# Patient Record
Sex: MALE | Race: WHITE | NOT HISPANIC OR LATINO | Employment: FULL TIME | URBAN - METROPOLITAN AREA
[De-identification: names, ages, dates, MRNs, and addresses within clinical notes are randomized per-mention and may not be internally consistent; named-entity substitution may affect disease eponyms.]

---

## 2017-01-13 ENCOUNTER — HOSPITAL ENCOUNTER (OUTPATIENT)
Dept: CARDIOLOGY | Facility: CLINIC | Age: 32
Discharge: HOME | End: 2017-01-13
Payer: COMMERCIAL

## 2017-01-13 ENCOUNTER — OFFICE VISIT (OUTPATIENT)
Dept: CARDIOLOGY | Facility: CLINIC | Age: 32
End: 2017-01-13
Payer: COMMERCIAL

## 2017-01-13 VITALS
HEART RATE: 73 BPM | SYSTOLIC BLOOD PRESSURE: 117 MMHG | DIASTOLIC BLOOD PRESSURE: 88 MMHG | HEIGHT: 67 IN | BODY MASS INDEX: 34.5 KG/M2 | WEIGHT: 219.81 LBS

## 2017-01-13 DIAGNOSIS — I10 ESSENTIAL HYPERTENSION: Primary | ICD-10-CM

## 2017-01-13 DIAGNOSIS — E66.09 NON MORBID OBESITY DUE TO EXCESS CALORIES: ICD-10-CM

## 2017-01-13 DIAGNOSIS — I10 ESSENTIAL HYPERTENSION: ICD-10-CM

## 2017-01-13 DIAGNOSIS — E78.00 PURE HYPERCHOLESTEROLEMIA: ICD-10-CM

## 2017-01-13 PROCEDURE — 99999 PR PBB SHADOW E&M-EST. PATIENT-LVL III: CPT | Mod: PBBFAC,,, | Performed by: INTERNAL MEDICINE

## 2017-01-13 PROCEDURE — 1159F MED LIST DOCD IN RCRD: CPT | Mod: S$GLB,,, | Performed by: INTERNAL MEDICINE

## 2017-01-13 PROCEDURE — 3079F DIAST BP 80-89 MM HG: CPT | Mod: S$GLB,,, | Performed by: INTERNAL MEDICINE

## 2017-01-13 PROCEDURE — 3074F SYST BP LT 130 MM HG: CPT | Mod: S$GLB,,, | Performed by: INTERNAL MEDICINE

## 2017-01-13 PROCEDURE — 99214 OFFICE O/P EST MOD 30 MIN: CPT | Mod: S$GLB,,, | Performed by: INTERNAL MEDICINE

## 2017-01-13 PROCEDURE — 93000 ELECTROCARDIOGRAM COMPLETE: CPT | Mod: S$GLB,,, | Performed by: INTERNAL MEDICINE

## 2017-01-23 ENCOUNTER — PATIENT MESSAGE (OUTPATIENT)
Dept: PSYCHIATRY | Facility: CLINIC | Age: 32
End: 2017-01-23

## 2017-01-23 DIAGNOSIS — F32.A DEPRESSION, UNSPECIFIED DEPRESSION TYPE: ICD-10-CM

## 2017-01-23 RX ORDER — BUPROPION HYDROCHLORIDE 150 MG/1
450 TABLET ORAL DAILY
Qty: 270 TABLET | Refills: 3 | Status: SHIPPED | OUTPATIENT
Start: 2017-01-23 | End: 2017-02-10 | Stop reason: SDUPTHER

## 2017-02-09 RX ORDER — SERTRALINE HYDROCHLORIDE 100 MG/1
TABLET, FILM COATED ORAL
Qty: 135 TABLET | Refills: 0 | Status: SHIPPED | OUTPATIENT
Start: 2017-02-09 | End: 2017-04-28 | Stop reason: SDUPTHER

## 2017-02-10 ENCOUNTER — PATIENT MESSAGE (OUTPATIENT)
Dept: PSYCHIATRY | Facility: CLINIC | Age: 32
End: 2017-02-10

## 2017-02-10 DIAGNOSIS — F32.A DEPRESSION, UNSPECIFIED DEPRESSION TYPE: ICD-10-CM

## 2017-02-10 RX ORDER — BUPROPION HYDROCHLORIDE 150 MG/1
450 TABLET ORAL DAILY
Qty: 270 TABLET | Refills: 3 | Status: SHIPPED | OUTPATIENT
Start: 2017-02-10 | End: 2018-03-07 | Stop reason: SDUPTHER

## 2017-02-13 ENCOUNTER — PATIENT MESSAGE (OUTPATIENT)
Dept: PSYCHIATRY | Facility: CLINIC | Age: 32
End: 2017-02-13

## 2017-04-28 ENCOUNTER — OFFICE VISIT (OUTPATIENT)
Dept: PSYCHIATRY | Facility: CLINIC | Age: 32
End: 2017-04-28
Payer: COMMERCIAL

## 2017-04-28 VITALS
HEART RATE: 96 BPM | HEIGHT: 67 IN | WEIGHT: 220 LBS | SYSTOLIC BLOOD PRESSURE: 134 MMHG | DIASTOLIC BLOOD PRESSURE: 71 MMHG | BODY MASS INDEX: 34.53 KG/M2

## 2017-04-28 DIAGNOSIS — F41.1 GAD (GENERALIZED ANXIETY DISORDER): ICD-10-CM

## 2017-04-28 DIAGNOSIS — F33.42 MAJOR DEPRESSIVE DISORDER, RECURRENT EPISODE, IN FULL REMISSION: Primary | ICD-10-CM

## 2017-04-28 PROCEDURE — 99214 OFFICE O/P EST MOD 30 MIN: CPT | Mod: S$GLB,,, | Performed by: PSYCHIATRY & NEUROLOGY

## 2017-04-28 PROCEDURE — 3075F SYST BP GE 130 - 139MM HG: CPT | Mod: S$GLB,,, | Performed by: PSYCHIATRY & NEUROLOGY

## 2017-04-28 PROCEDURE — 99999 PR PBB SHADOW E&M-EST. PATIENT-LVL II: CPT | Mod: PBBFAC,,, | Performed by: PSYCHIATRY & NEUROLOGY

## 2017-04-28 PROCEDURE — 3078F DIAST BP <80 MM HG: CPT | Mod: S$GLB,,, | Performed by: PSYCHIATRY & NEUROLOGY

## 2017-04-28 RX ORDER — SERTRALINE HYDROCHLORIDE 100 MG/1
TABLET, FILM COATED ORAL
Qty: 135 TABLET | Refills: 1 | Status: SHIPPED | OUTPATIENT
Start: 2017-04-28 | End: 2017-12-20 | Stop reason: SDUPTHER

## 2017-04-28 NOTE — PROGRESS NOTES
Outpatient Psychiatry Follow-Up Visit (MD/NP)    4/28/2017    Clinical Status of Patient:  Outpatient (Ambulatory)    Chief Complaint:  Schuyler Calles Jr. is a 31 y.o. male who presents today for follow-up of depression.  Met with patient.      Interval History and Content of Current Session:     Pt reports he has been feeling well.  He has been down about not being able to lose weight.  He has been going to the gym for several months 3 times a week and continues to gain weight.  He reports he feels bad about himself.  He admits to eating fast food on the weekends.  He otherwise feels good.  Denies feeling depressed.  He is in a relationship and they are active.  He spends much of his time reading on the W/[employer] and is not eager to spend much money.  He likes his job, though it is sometimes stressful.  He still has a bottle of clonazepam that he keeps at work, last took a dose 3 wks ago.        Prior trials:  prozac - took 2-3 yrs before it started to work less well  seroquel - helped for a while, no side effects recalled.  Retrial caused weight gain.    cymbalta - inadequate at greater than 60 mg  abilify - did not help  Buspirone - fogginess  effexor - does not recall  lexapro- became ineffective      Psychotherapy:  · Target symptoms: depression  · Why chosen therapy is appropriate versus another modality: relevant to diagnosis, evidence based practice  · Outcome monitoring methods: self-report, observation  · Therapeutic intervention type: supportive psychotherapy  · Topics discussed/themes: building skills sets for symptom management, symptom recognition  · The patient's response to the intervention is motivated. The patient's progress toward treatment goals is fair.   · Duration of intervention: 5 minutes.    Review of Systems   · PSYCHIATRIC: Pertinant items are noted in the narrative.  · CONSTITUTIONAL: No weight gain or loss.   · MUSCULOSKELETAL: No pain or stiffness of the joints.  · RESPIRATORY: No  "shortness of breath.  · CARDIOVASCULAR: No tachycardia or chest pain.  · GASTROINTESTINAL: No nausea, vomiting, pain, constipation or diarrhea.  · GENITOURINARY: No frequency, dysuria or sexual dysfunction.    Past Medical, Family and Social History: The patient's past medical, family and social history have been reviewed and updated as appropriate within the electronic medical record - see encounter notes.    Compliance: yes    Side effects: None    Risk Parameters:  Patient reports no suicidal ideation  Patient reports no homicidal ideation  Patient reports no self-injurious behavior  Patient reports no violent behavior    Exam (detailed: at least 9 elements; comprehensive: all 15 elements)   Constitutional  Vitals:  Most recent vital signs, dated less than 90 days prior to this appointment, were reviewed.   Vitals:    04/28/17 1001   BP: 134/71   Pulse: 96   Weight: 99.8 kg (220 lb)   Height: 5' 7" (1.702 m)        General:  age appropriate, casually dressed, neatly groomed     Musculoskeletal  Muscle Strength/Tone:  no dyskinesia, no tremor   Gait & Station:  non-ataxic     Psychiatric  Speech:  not pressured, rapid or loud, but clearly audible, no articulation errors, studdering or slurring   Mood:    Affect:  Near euthymic  appropriate, mood-congruent   Thought Process:  goal-directed, logical   Associations:  intact   Thought Content:  normal, no suicidality, no homicidality, delusions, or paranoia   Insight:  has awareness of illness   Judgement: behavior is adequate to circumstances   Orientation:  grossly intact   Memory: intact for content of interview   Language: grossly intact   Attention Span & Concentration:  able to focus   Fund of Knowledge:  intact and appropriate to age and level of education     Assessment and Diagnosis   Status/Progress: Based on the examination today, the patient's problem(s) is/are inadequately controlled.  New problems have not been presented today.   Co-morbidities are " complicating management of the primary condition.  There are no active rule-out diagnoses for this patient at this time.     General Impression: Pt with a h/o depression and prior treatment that included some augmentation with varying success.     Diagnosis:  Major Depressive Disorder recurrent in rem  GOLDIE      Intervention/Counseling/Treatment Plan   · Continue Zoloft 150 mg daily  · Continue Wellbutrin 450mg daily  · Continue clonazepam as needed.      Return to Clinic:  6 months

## 2017-07-22 ENCOUNTER — PATIENT MESSAGE (OUTPATIENT)
Dept: PSYCHIATRY | Facility: CLINIC | Age: 32
End: 2017-07-22

## 2017-07-30 ENCOUNTER — PATIENT MESSAGE (OUTPATIENT)
Dept: PSYCHIATRY | Facility: CLINIC | Age: 32
End: 2017-07-30

## 2017-08-03 RX ORDER — ZOLPIDEM TARTRATE 10 MG/1
10 TABLET ORAL NIGHTLY PRN
Qty: 30 TABLET | Refills: 1 | Status: SHIPPED | OUTPATIENT
Start: 2017-08-03 | End: 2018-03-07 | Stop reason: SDUPTHER

## 2017-09-15 ENCOUNTER — PATIENT MESSAGE (OUTPATIENT)
Dept: PSYCHIATRY | Facility: CLINIC | Age: 32
End: 2017-09-15

## 2017-09-15 RX ORDER — CLONAZEPAM 0.5 MG/1
TABLET ORAL
Qty: 60 TABLET | Refills: 2 | Status: SHIPPED | OUTPATIENT
Start: 2017-09-15 | End: 2018-01-23 | Stop reason: SDUPTHER

## 2017-12-20 RX ORDER — SERTRALINE HYDROCHLORIDE 100 MG/1
TABLET, FILM COATED ORAL
Qty: 135 TABLET | Refills: 0 | Status: SHIPPED | OUTPATIENT
Start: 2017-12-20 | End: 2018-03-07 | Stop reason: SDUPTHER

## 2017-12-20 RX ORDER — CARVEDILOL 25 MG/1
25 TABLET ORAL 2 TIMES DAILY
Qty: 180 TABLET | Refills: 3 | Status: SHIPPED | OUTPATIENT
Start: 2017-12-20 | End: 2018-12-12 | Stop reason: SDUPTHER

## 2017-12-21 DIAGNOSIS — I10 ESSENTIAL HYPERTENSION: ICD-10-CM

## 2017-12-21 RX ORDER — LOSARTAN POTASSIUM 50 MG/1
50 TABLET ORAL DAILY
Qty: 90 TABLET | Refills: 3 | Status: SHIPPED | OUTPATIENT
Start: 2017-12-21 | End: 2018-12-12 | Stop reason: SDUPTHER

## 2018-01-23 RX ORDER — CLONAZEPAM 0.5 MG/1
TABLET ORAL
Qty: 60 TABLET | Refills: 0 | Status: SHIPPED | OUTPATIENT
Start: 2018-01-23 | End: 2018-09-07 | Stop reason: SDUPTHER

## 2018-02-10 ENCOUNTER — OFFICE VISIT (OUTPATIENT)
Dept: INTERNAL MEDICINE | Facility: CLINIC | Age: 33
End: 2018-02-10
Payer: COMMERCIAL

## 2018-02-10 VITALS
BODY MASS INDEX: 31.59 KG/M2 | TEMPERATURE: 99 F | HEIGHT: 67 IN | SYSTOLIC BLOOD PRESSURE: 122 MMHG | WEIGHT: 201.25 LBS | HEART RATE: 76 BPM | DIASTOLIC BLOOD PRESSURE: 90 MMHG

## 2018-02-10 DIAGNOSIS — R21 RASH: Primary | ICD-10-CM

## 2018-02-10 PROCEDURE — 99213 OFFICE O/P EST LOW 20 MIN: CPT | Mod: S$GLB,,, | Performed by: FAMILY MEDICINE

## 2018-02-10 PROCEDURE — 3008F BODY MASS INDEX DOCD: CPT | Mod: S$GLB,,, | Performed by: FAMILY MEDICINE

## 2018-02-10 PROCEDURE — 99999 PR PBB SHADOW E&M-EST. PATIENT-LVL III: CPT | Mod: PBBFAC,,, | Performed by: FAMILY MEDICINE

## 2018-02-10 RX ORDER — EMTRICITABINE AND TENOFOVIR DISOPROXIL FUMARATE 200; 300 MG/1; MG/1
1 TABLET, FILM COATED ORAL DAILY
COMMUNITY
Start: 2018-01-29

## 2018-02-10 RX ORDER — VALACYCLOVIR HYDROCHLORIDE 500 MG/1
500 TABLET, FILM COATED ORAL 2 TIMES DAILY
Qty: 10 TABLET | Refills: 0 | Status: SHIPPED | OUTPATIENT
Start: 2018-02-10 | End: 2018-05-18

## 2018-02-10 NOTE — PROGRESS NOTES
Subjective:     Patient ID: Schuyler Calles Jr. is a 32 y.o. male.    Chief Complaint: Herpes Zoster (Possible / by right eye ) and Rash (by right eye)    HPI painfulrash about his left eyelid present for 5 days, no other lesions, he has been applying medicated cream and then some hydrocortisone cream but it hasnt gotten better,   He denies feeling poorly before this happened-no flu symptoms prior to onset of rash. . He felt his itchy and irritated on Wednesday but not since. No drainage.    Review of Systems  per HPI  Objective:      Physical Exam   Constitutional: He appears well-developed and well-nourished.   HENT:   Head: Normocephalic and atraumatic.   Right Ear: External ear normal.   Left Ear: External ear normal.   Nose: Nose normal.   Mouth/Throat: Oropharynx is clear and moist.   Eyes: Conjunctivae and EOM are normal. Pupils are equal, round, and reactive to light.   Neck: Normal range of motion. Neck supple. No thyroid mass and no thyromegaly present.       2 cm nodular tender lymph nodes both anteriorly and posteriorly to angle to the jaw    Cardiovascular: Normal rate, regular rhythm, normal heart sounds and intact distal pulses.    Pulmonary/Chest: Effort normal and breath sounds normal.   Lymphadenopathy:     He has cervical adenopathy (left ).   Psychiatric: He has a normal mood and affect. His behavior is normal. Judgment and thought content normal.   Nursing note and vitals reviewed.     erythematous based cluster of vesicles at left lateral  eyelid , no other vesicles on the rest of the head or scalp.     Assessment:     Schuyler was seen today for herpes zoster and rash.    Diagnoses and all orders for this visit:    Rash  Appears to be HSV rash   -pt will check with [facility] -where he goes for his PRep treatment  He had labs with them before beginning Truveda. He thought he was negative for all the tests that he knew of.       If in fact his HSV was positive then I feel this is most  likely simply an activation of that. If his tests were negative then he will have the new HSV tests done that I ordered today.   -     Herpes simplex type 1&2 IgG,Herpes titer; Future    Other orders  -     valACYclovir (VALTREX) 500 MG tablet; Take 1 tablet (500 mg total) by mouth 2 (two) times daily.

## 2018-02-19 ENCOUNTER — LAB VISIT (OUTPATIENT)
Dept: LAB | Facility: HOSPITAL | Age: 33
End: 2018-02-19
Attending: INTERNAL MEDICINE
Payer: COMMERCIAL

## 2018-02-19 DIAGNOSIS — I10 ESSENTIAL HYPERTENSION: ICD-10-CM

## 2018-02-19 DIAGNOSIS — E78.00 PURE HYPERCHOLESTEROLEMIA: ICD-10-CM

## 2018-02-19 LAB
ALBUMIN SERPL BCP-MCNC: 4 G/DL
ALP SERPL-CCNC: 101 U/L
ALT SERPL W/O P-5'-P-CCNC: 50 U/L
ANION GAP SERPL CALC-SCNC: 10 MMOL/L
AST SERPL-CCNC: 146 U/L
BILIRUB SERPL-MCNC: 0.5 MG/DL
BUN SERPL-MCNC: 8 MG/DL
CALCIUM SERPL-MCNC: 9.3 MG/DL
CHLORIDE SERPL-SCNC: 105 MMOL/L
CHOLEST SERPL-MCNC: 185 MG/DL
CHOLEST SERPL-MCNC: 185 MG/DL
CHOLEST/HDLC SERPL: 4.7 {RATIO}
CHOLEST/HDLC SERPL: 4.7 {RATIO}
CO2 SERPL-SCNC: 24 MMOL/L
CREAT SERPL-MCNC: 1 MG/DL
EST. GFR  (AFRICAN AMERICAN): >60 ML/MIN/1.73 M^2
EST. GFR  (NON AFRICAN AMERICAN): >60 ML/MIN/1.73 M^2
GLUCOSE SERPL-MCNC: 84 MG/DL
HDLC SERPL-MCNC: 39 MG/DL
HDLC SERPL-MCNC: 39 MG/DL
HDLC SERPL: 21.1 %
HDLC SERPL: 21.1 %
LDLC SERPL CALC-MCNC: 127 MG/DL
LDLC SERPL CALC-MCNC: 127 MG/DL
NONHDLC SERPL-MCNC: 146 MG/DL
NONHDLC SERPL-MCNC: 146 MG/DL
POTASSIUM SERPL-SCNC: 4.1 MMOL/L
PROT SERPL-MCNC: 7.4 G/DL
SODIUM SERPL-SCNC: 139 MMOL/L
TRIGL SERPL-MCNC: 95 MG/DL
TRIGL SERPL-MCNC: 95 MG/DL

## 2018-02-19 PROCEDURE — 36415 COLL VENOUS BLD VENIPUNCTURE: CPT | Mod: PO

## 2018-02-19 PROCEDURE — 80053 COMPREHEN METABOLIC PANEL: CPT

## 2018-02-19 PROCEDURE — 80061 LIPID PANEL: CPT

## 2018-03-07 ENCOUNTER — OFFICE VISIT (OUTPATIENT)
Dept: PSYCHIATRY | Facility: CLINIC | Age: 33
End: 2018-03-07
Payer: COMMERCIAL

## 2018-03-07 VITALS
HEIGHT: 67 IN | WEIGHT: 199.75 LBS | BODY MASS INDEX: 31.35 KG/M2 | DIASTOLIC BLOOD PRESSURE: 95 MMHG | SYSTOLIC BLOOD PRESSURE: 149 MMHG | HEART RATE: 104 BPM

## 2018-03-07 DIAGNOSIS — F41.1 GAD (GENERALIZED ANXIETY DISORDER): Primary | ICD-10-CM

## 2018-03-07 DIAGNOSIS — F33.40 MDD (RECURRENT MAJOR DEPRESSIVE DISORDER) IN REMISSION: ICD-10-CM

## 2018-03-07 PROCEDURE — 99999 PR PBB SHADOW E&M-EST. PATIENT-LVL II: CPT | Mod: PBBFAC,,, | Performed by: PSYCHIATRY & NEUROLOGY

## 2018-03-07 PROCEDURE — 99214 OFFICE O/P EST MOD 30 MIN: CPT | Mod: S$GLB,,, | Performed by: PSYCHIATRY & NEUROLOGY

## 2018-03-07 RX ORDER — ZOLPIDEM TARTRATE 10 MG/1
10 TABLET ORAL NIGHTLY PRN
Qty: 30 TABLET | Refills: 3 | Status: SHIPPED | OUTPATIENT
Start: 2018-03-07 | End: 2018-09-07 | Stop reason: SDUPTHER

## 2018-03-07 RX ORDER — SERTRALINE HYDROCHLORIDE 100 MG/1
TABLET, FILM COATED ORAL
Qty: 135 TABLET | Refills: 1 | Status: SHIPPED | OUTPATIENT
Start: 2018-03-07 | End: 2018-09-07 | Stop reason: SDUPTHER

## 2018-03-07 RX ORDER — BUPROPION HYDROCHLORIDE 150 MG/1
450 TABLET ORAL DAILY
Qty: 270 TABLET | Refills: 1 | Status: SHIPPED | OUTPATIENT
Start: 2018-03-07 | End: 2018-09-07 | Stop reason: SDUPTHER

## 2018-03-07 NOTE — PROGRESS NOTES
Outpatient Psychiatry Follow-Up Visit (MD/NP)    3/7/2018    Clinical Status of Patient:  Outpatient (Ambulatory)    Chief Complaint:  Schuyler Calles Jr. is a 32 y.o. male who presents today for follow-up of depression.  Met with patient.      Interval History and Content of Current Session:     Pt not seen in nearly a year reports it is mostly because he has been doing well.   He has some depression concerns but this does not last long.   3 weeks ago he was feelign down.  It lasted perhaps a week.  Sometimes it si because he is feeling overwhelmed with work.   He is no longer having problems with sleep.  He has been out of ambien for 3 weeks.  Denies desire for death. Able to concentrate.  Remains active.  No longer in relationship.  Denies significant anxiety.  He keeps clonazepam at work.  He has not taken it in two weeks.  Reports having enough at home to last perhaps a year at the rate he is taking it.        Prior trials:  prozac - took it for 2-3 yrs before it started to work less well  seroquel - helped for a while, no side effects recalled.  Retrial caused weight gain.    cymbalta - inadequate at greater than 60 mg  abilify - did not help  Buspirone - fogginess  effexor - does not recall  lexapro- became ineffective      Psychotherapy:  · Target symptoms: depression  · Why chosen therapy is appropriate versus another modality: relevant to diagnosis, evidence based practice  · Outcome monitoring methods: self-report, observation  · Therapeutic intervention type: supportive psychotherapy  · Topics discussed/themes: building skills sets for symptom management, symptom recognition  · The patient's response to the intervention is motivated. The patient's progress toward treatment goals is fair.   · Duration of intervention: 5 minutes.    Review of Systems   · PSYCHIATRIC: Pertinant items are noted in the narrative.  · CONSTITUTIONAL: No weight gain or loss.   · MUSCULOSKELETAL: No pain or stiffness of the  "joints.  · RESPIRATORY: No shortness of breath.  · CARDIOVASCULAR: No tachycardia or chest pain.  · GASTROINTESTINAL: No nausea, vomiting, pain, constipation or diarrhea.  · GENITOURINARY: No frequency, dysuria or sexual dysfunction.    Past Medical, Family and Social History: The patient's past medical, family and social history have been reviewed and updated as appropriate within the electronic medical record - see encounter notes.    Compliance: yes    Side effects: None    Risk Parameters:  Patient reports no suicidal ideation  Patient reports no homicidal ideation  Patient reports no self-injurious behavior  Patient reports no violent behavior    Exam (detailed: at least 9 elements; comprehensive: all 15 elements)   Constitutional  Vitals:  Most recent vital signs, dated less than 90 days prior to this appointment, were reviewed.   Vitals:    03/07/18 0815   BP: (!) 149/95   Pulse: 104   Weight: 90.6 kg (199 lb 11.8 oz)   Height: 5' 7" (1.702 m)        General:  age appropriate, casually dressed, neatly groomed     Musculoskeletal  Muscle Strength/Tone:  no dyskinesia, no tremor   Gait & Station:  non-ataxic     Psychiatric  Speech:  not pressured, rapid or loud, but clearly audible, no articulation errors, studdering or slurring   Mood:    Affect:  euthymic  appropriate, mood-congruent   Thought Process:  goal-directed, logical   Associations:  intact   Thought Content:  normal, no suicidality, no homicidality, delusions, or paranoia   Insight:  has awareness of illness   Judgement: behavior is adequate to circumstances   Orientation:  grossly intact   Memory: intact for content of interview   Language: grossly intact   Attention Span & Concentration:  able to focus   Fund of Knowledge:  intact and appropriate to age and level of education     Assessment and Diagnosis   Status/Progress: Based on the examination today, the patient's problem(s) is/are adequately but not ideally controlled.  New problems have not " been presented today.   Co-morbidities are complicating management of the primary condition.  There are no active rule-out diagnoses for this patient at this time.     General Impression: Pt with a h/o depression and prior treatment that included some augmentation with varying success.     Diagnosis:  Major Depressive Disorder recurrent in rem  GOLDIE      Intervention/Counseling/Treatment Plan   · Continue Zoloft 150 mg daily  · Continue Wellbutrin 450mg daily  · Continue clonazepam as needed.  · Ambien as needed.      Return to Clinic:  6 months

## 2018-05-18 ENCOUNTER — LAB VISIT (OUTPATIENT)
Dept: LAB | Facility: HOSPITAL | Age: 33
End: 2018-05-18
Attending: INTERNAL MEDICINE
Payer: COMMERCIAL

## 2018-05-18 ENCOUNTER — OFFICE VISIT (OUTPATIENT)
Dept: CARDIOLOGY | Facility: CLINIC | Age: 33
End: 2018-05-18
Payer: COMMERCIAL

## 2018-05-18 VITALS
HEIGHT: 67 IN | BODY MASS INDEX: 30.45 KG/M2 | SYSTOLIC BLOOD PRESSURE: 119 MMHG | DIASTOLIC BLOOD PRESSURE: 80 MMHG | WEIGHT: 194 LBS

## 2018-05-18 DIAGNOSIS — R79.89 ABNORMAL LFTS: ICD-10-CM

## 2018-05-18 DIAGNOSIS — I10 ESSENTIAL HYPERTENSION: Primary | ICD-10-CM

## 2018-05-18 DIAGNOSIS — E78.00 PURE HYPERCHOLESTEROLEMIA: ICD-10-CM

## 2018-05-18 LAB
ALBUMIN SERPL BCP-MCNC: 4 G/DL
ALP SERPL-CCNC: 107 U/L
ALT SERPL W/O P-5'-P-CCNC: 21 U/L
AST SERPL-CCNC: 20 U/L
BILIRUB DIRECT SERPL-MCNC: 0.2 MG/DL
BILIRUB SERPL-MCNC: 0.5 MG/DL
PROT SERPL-MCNC: 7.5 G/DL

## 2018-05-18 PROCEDURE — 99214 OFFICE O/P EST MOD 30 MIN: CPT | Mod: S$GLB,,, | Performed by: INTERNAL MEDICINE

## 2018-05-18 PROCEDURE — 80076 HEPATIC FUNCTION PANEL: CPT

## 2018-05-18 PROCEDURE — 36415 COLL VENOUS BLD VENIPUNCTURE: CPT

## 2018-05-18 PROCEDURE — 99999 PR PBB SHADOW E&M-EST. PATIENT-LVL III: CPT | Mod: PBBFAC,,, | Performed by: INTERNAL MEDICINE

## 2018-05-18 RX ORDER — EPINEPHRINE 0.3 MG/.3ML
INJECTION SUBCUTANEOUS
Refills: 0 | Status: ON HOLD | COMMUNITY
Start: 2018-03-07 | End: 2020-01-06

## 2018-05-18 NOTE — PROGRESS NOTES
Subjective:    Patient ID:  Schuyler Calles Jr. is a 32 y.o. male who presents for follow-up of Hypertension (Annual Exam )      HPI     32 year old male is followed with essential hypertension. He continues to do well and has lost 25# since his last visit. He has been exercising. He has no chest pain or LEGGETT. His St and LT are abit elevated.  Lab Results   Component Value Date     02/19/2018    K 4.1 02/19/2018     02/19/2018    CO2 24 02/19/2018    BUN 8 02/19/2018    CREATININE 1.0 02/19/2018    GLU 84 02/19/2018     (H) 02/19/2018    ALT 50 (H) 02/19/2018    ALBUMIN 4.0 02/19/2018    PROT 7.4 02/19/2018    BILITOT 0.5 02/19/2018    WBC 5.26 06/10/2016    HGB 13.9 (L) 06/10/2016    HCT 39.5 (L) 06/10/2016    MCV 87 06/10/2016     06/10/2016    INR 1.0 02/02/2014    TSH 1.297 03/11/2016         Lab Results   Component Value Date    CHOL 185 02/19/2018    CHOL 185 02/19/2018    HDL 39 (L) 02/19/2018    HDL 39 (L) 02/19/2018    TRIG 95 02/19/2018    TRIG 95 02/19/2018       Lab Results   Component Value Date    LDLCALC 127.0 02/19/2018    LDLCALC 127.0 02/19/2018       Past Medical History:   Diagnosis Date    Anxiety     Depression     History of psychiatric care     Hypertension        Current Outpatient Prescriptions:     buPROPion (WELLBUTRIN XL) 150 MG TB24 tablet, Take 3 tablets (450 mg total) by mouth once daily., Disp: 270 tablet, Rfl: 1    carvedilol (COREG) 25 MG tablet, Take 1 tablet (25 mg total) by mouth 2 (two) times daily., Disp: 180 tablet, Rfl: 3    clonazePAM (KLONOPIN) 0.5 MG tablet, TAKE 1 TO 2 TABLETS BY MOUTH EVERY NIGHT AT BEDTIME AS NEEDED FOR ANXIETY, Disp: 60 tablet, Rfl: 0    EPINEPHrine (EPIPEN) 0.3 mg/0.3 mL AtIn, USE UTD, Disp: , Rfl: 0    losartan (COZAAR) 50 MG tablet, Take 1 tablet (50 mg total) by mouth once daily., Disp: 90 tablet, Rfl: 3    multivitamin (ONE DAILY MULTIVITAMIN) per tablet, Take 1 tablet by mouth once daily., Disp: , Rfl:      "sertraline (ZOLOFT) 100 MG tablet, TAKE 1 AND 1/2 TABLETS ONCEDAILY, Disp: 135 tablet, Rfl: 1    TRUVADA 200-300 mg Tab, Take 1 tablet by mouth once daily. , Disp: , Rfl:     zolpidem (AMBIEN) 10 mg Tab, Take 1 tablet (10 mg total) by mouth nightly as needed., Disp: 30 tablet, Rfl: 3        Review of Systems   Constitution: Negative for decreased appetite, diaphoresis, fever, weakness, malaise/fatigue, weight gain and weight loss.   HENT: Negative for congestion, ear discharge, ear pain and nosebleeds.    Eyes: Negative for blurred vision, double vision and visual disturbance.   Cardiovascular: Negative for chest pain, claudication, cyanosis, dyspnea on exertion, irregular heartbeat, leg swelling, near-syncope, orthopnea, palpitations, paroxysmal nocturnal dyspnea and syncope.   Respiratory: Negative for cough, hemoptysis, shortness of breath, sleep disturbances due to breathing, snoring, sputum production and wheezing.    Endocrine: Negative for polydipsia, polyphagia and polyuria.   Hematologic/Lymphatic: Negative for adenopathy and bleeding problem. Does not bruise/bleed easily.   Skin: Negative for color change, nail changes, poor wound healing and rash.   Musculoskeletal: Negative for muscle cramps and muscle weakness.   Gastrointestinal: Negative for abdominal pain, anorexia, change in bowel habit, hematochezia, nausea and vomiting.   Genitourinary: Negative for dysuria, frequency and hematuria.   Neurological: Negative for brief paralysis, difficulty with concentration, excessive daytime sleepiness, dizziness, focal weakness, headaches, light-headedness, seizures and vertigo.   Psychiatric/Behavioral: Negative for altered mental status and depression.   Allergic/Immunologic: Negative for persistent infections.        Objective:/80 (BP Location: Left arm, Patient Position: Sitting, BP Method: Medium (Automatic))   Ht 5' 7" (1.702 m)   Wt 88 kg (194 lb 0.1 oz)   BMI 30.39 kg/m²           Physical " Exam   Constitutional: He is oriented to person, place, and time. He appears well-developed and well-nourished.   HENT:   Head: Normocephalic.   Right Ear: External ear normal.   Left Ear: External ear normal.   Nose: Nose normal.   Inspection of lips, teeth and gums normal   Eyes: EOM are normal. Pupils are equal, round, and reactive to light. No scleral icterus.   Neck: Normal range of motion. Neck supple. No JVD present. No tracheal deviation present. No thyromegaly present.   Cardiovascular: Normal rate, regular rhythm and intact distal pulses.  Exam reveals no gallop and no friction rub.    No murmur heard.  Pulses:       Carotid pulses are 2+ on the right side, and 2+ on the left side.       Dorsalis pedis pulses are 2+ on the right side, and 2+ on the left side.        Posterior tibial pulses are 2+ on the right side, and 2+ on the left side.   Pulmonary/Chest: Effort normal and breath sounds normal.   Abdominal: Bowel sounds are normal. He exhibits no distension. There is no hepatosplenomegaly. There is no tenderness. There is no guarding.   Musculoskeletal: Normal range of motion. He exhibits no edema or tenderness.   Lymphadenopathy:   Palpation of neck and groin lymph nodes normal   Neurological: He is alert and oriented to person, place, and time. No cranial nerve deficit. He exhibits normal muscle tone. Coordination normal.   Skin: Skin is dry.   Palpation of skin normal   Psychiatric: His behavior is normal. Judgment and thought content normal.         Assessment:       1. Essential hypertension    2. Pure hypercholesterolemia    3. Abnormal LFTs         Plan:       Schuyler was seen today for hypertension.    Diagnoses and all orders for this visit:    Essential hypertension  -     Comprehensive metabolic panel; Future; Expected date: 05/18/2019  -     CBC auto differential; Future; Expected date: 05/18/2019    Pure hypercholesterolemia  -     Lipid panel; Future; Expected date: 05/18/2019    Abnormal  LFTs  -     Hepatic function panel; Future; Expected date: 05/18/2018    Other orders  -     EPINEPHrine (EPIPEN) 0.3 mg/0.3 mL AtIn; USE UTD

## 2018-06-25 NOTE — PROGRESS NOTES
Subjective:    Patient ID:  Schuyler Calles Jr. is a 31 y.o. male who presents for follow-up of Hypertension      HPI  31 year old male is followed with essential hypertension. He has gained 28 pounds since his last vist. He reports no chest pain or LEGGETT. He plans to go on a weight reduction diet and has jus started an exercise program.    Lab Results   Component Value Date     06/10/2016    K 5.1 06/10/2016     06/10/2016    CO2 26 06/10/2016    BUN 18 06/10/2016    CREATININE 1.1 06/10/2016     06/10/2016    AST 16 06/10/2016    ALT 15 06/10/2016    ALBUMIN 4.2 06/10/2016    PROT 7.6 06/10/2016    BILITOT 0.6 06/10/2016    WBC 5.26 06/10/2016    HGB 13.9 (L) 06/10/2016    HCT 39.5 (L) 06/10/2016    MCV 87 06/10/2016     06/10/2016    INR 1.0 02/02/2014    TSH 1.297 03/11/2016         Lab Results   Component Value Date    CHOL 247 (H) 06/10/2016    HDL 53 06/10/2016    TRIG 80 06/10/2016       Lab Results   Component Value Date    LDLCALC 178.0 (H) 06/10/2016     Past Medical History   Diagnosis Date    Anxiety     Depression     History of psychiatric care     Hypertension      Current Outpatient Prescriptions   Medication Sig    buPROPion (WELLBUTRIN XL) 150 MG TB24 tablet Take 3 tablets (450 mg total) by mouth once daily.    carvedilol (COREG) 25 MG tablet Take 1 tablet (25 mg total) by mouth 2 (two) times daily.    clonazePAM (KLONOPIN) 0.5 MG tablet TAKE 1 TO 2 TABLETS BY MOUTH NIGHTLY AS NEEDED FOR ANXIETY    losartan (COZAAR) 50 MG tablet Take 1 tablet (50 mg total) by mouth once daily.    multivitamin (ONE DAILY MULTIVITAMIN) per tablet Take 1 tablet by mouth once daily.    sertraline (ZOLOFT) 100 MG tablet Take 1.5 tablets (150 mg total) by mouth once daily.     No current facility-administered medications for this visit.      Review of Systems   Constitution: Positive for weight gain. Negative for decreased appetite, diaphoresis, fever, weakness, malaise/fatigue and  "weight loss.   HENT: Negative for congestion, ear discharge, ear pain, headaches and nosebleeds.    Eyes: Negative for blurred vision, double vision and visual disturbance.   Cardiovascular: Negative for chest pain, claudication, cyanosis, dyspnea on exertion, irregular heartbeat, leg swelling, near-syncope, orthopnea, palpitations, paroxysmal nocturnal dyspnea and syncope.   Respiratory: Negative for cough, hemoptysis, shortness of breath, sleep disturbances due to breathing, snoring, sputum production and wheezing.    Endocrine: Negative for polydipsia, polyphagia and polyuria.   Hematologic/Lymphatic: Negative for adenopathy and bleeding problem. Does not bruise/bleed easily.   Skin: Negative for color change, nail changes, poor wound healing and rash.   Musculoskeletal: Negative for muscle cramps and muscle weakness.   Gastrointestinal: Negative for abdominal pain, anorexia, change in bowel habit, hematochezia, nausea and vomiting.   Genitourinary: Negative for dysuria, frequency and hematuria.   Neurological: Negative for brief paralysis, difficulty with concentration, excessive daytime sleepiness, dizziness, focal weakness, light-headedness, seizures and vertigo.   Psychiatric/Behavioral: Negative for altered mental status and depression.   Allergic/Immunologic: Negative for persistent infections.        Objective:  Visit Vitals    /88 (BP Location: Left arm, Patient Position: Sitting, BP Method: Automatic)    Pulse 73    Ht 5' 7" (1.702 m)    Wt 99.7 kg (219 lb 12.8 oz)    BMI 34.43 kg/m2       Physical Exam   Constitutional: He is oriented to person, place, and time. He appears well-developed and well-nourished.   HENT:   Head: Normocephalic.   Right Ear: External ear normal.   Left Ear: External ear normal.   Nose: Nose normal.   Inspection of lips, teeth and gums normal   Eyes: EOM are normal. Pupils are equal, round, and reactive to light. No scleral icterus.   Neck: Normal range of motion. " Neck supple. No JVD present. No tracheal deviation present. No thyromegaly present.   Cardiovascular: Normal rate, regular rhythm and intact distal pulses.  Exam reveals no gallop and no friction rub.    No murmur heard.  Pulses:       Dorsalis pedis pulses are 2+ on the right side, and 2+ on the left side.        Posterior tibial pulses are 2+ on the right side, and 2+ on the left side.   Pulmonary/Chest: Effort normal and breath sounds normal.   Abdominal: Bowel sounds are normal. He exhibits no distension. There is no hepatosplenomegaly. There is no tenderness. There is no guarding.   Musculoskeletal: Normal range of motion. He exhibits no edema or tenderness.   Lymphadenopathy:   Palpation of neck and groin lymph nodes normal   Neurological: He is alert and oriented to person, place, and time. No cranial nerve deficit. He exhibits normal muscle tone. Coordination normal.   Skin: Skin is dry.   Palpation of skin normal   Psychiatric: His behavior is normal. Judgment and thought content normal.         Assessment:       1. Essential hypertension    2. Non morbid obesity due to excess calories    3. Pure hypercholesterolemia         Plan:       Schuyler was seen today for hypertension.    Diagnoses and all orders for this visit:    Essential hypertension  -     Comprehensive metabolic panel; Future; Expected date: 1/13/18    Non morbid obesity due to excess calories    Pure hypercholesterolemia  -     Lipid panel; Future; Expected date: 7/15/17  -     Lipid panel; Future; Expected date: 1/13/18                 5

## 2018-09-07 ENCOUNTER — PATIENT MESSAGE (OUTPATIENT)
Dept: PSYCHIATRY | Facility: CLINIC | Age: 33
End: 2018-09-07

## 2018-09-07 DIAGNOSIS — F33.40 MDD (RECURRENT MAJOR DEPRESSIVE DISORDER) IN REMISSION: ICD-10-CM

## 2018-09-07 RX ORDER — SERTRALINE HYDROCHLORIDE 100 MG/1
TABLET, FILM COATED ORAL
Qty: 135 TABLET | Refills: 0 | Status: SHIPPED | OUTPATIENT
Start: 2018-09-07 | End: 2018-11-30 | Stop reason: SDUPTHER

## 2018-09-07 RX ORDER — ZOLPIDEM TARTRATE 10 MG/1
10 TABLET ORAL NIGHTLY PRN
Qty: 90 TABLET | Refills: 0 | Status: SHIPPED | OUTPATIENT
Start: 2018-09-07 | End: 2018-11-30 | Stop reason: SDUPTHER

## 2018-09-07 RX ORDER — CLONAZEPAM 0.5 MG/1
TABLET ORAL
Qty: 90 TABLET | Refills: 0 | Status: SHIPPED | OUTPATIENT
Start: 2018-09-07 | End: 2018-11-30 | Stop reason: SDUPTHER

## 2018-09-07 RX ORDER — BUPROPION HYDROCHLORIDE 150 MG/1
450 TABLET ORAL DAILY
Qty: 270 TABLET | Refills: 0 | Status: SHIPPED | OUTPATIENT
Start: 2018-09-07 | End: 2018-11-30 | Stop reason: SDUPTHER

## 2018-11-30 ENCOUNTER — OFFICE VISIT (OUTPATIENT)
Dept: PSYCHIATRY | Facility: CLINIC | Age: 33
End: 2018-11-30
Payer: COMMERCIAL

## 2018-11-30 VITALS
DIASTOLIC BLOOD PRESSURE: 84 MMHG | HEART RATE: 82 BPM | WEIGHT: 196.75 LBS | BODY MASS INDEX: 30.88 KG/M2 | SYSTOLIC BLOOD PRESSURE: 126 MMHG | HEIGHT: 67 IN

## 2018-11-30 DIAGNOSIS — F33.40 MDD (RECURRENT MAJOR DEPRESSIVE DISORDER) IN REMISSION: ICD-10-CM

## 2018-11-30 DIAGNOSIS — F41.1 GAD (GENERALIZED ANXIETY DISORDER): Primary | ICD-10-CM

## 2018-11-30 PROCEDURE — 99999 PR PBB SHADOW E&M-EST. PATIENT-LVL II: CPT | Mod: PBBFAC,,, | Performed by: PSYCHIATRY & NEUROLOGY

## 2018-11-30 PROCEDURE — 90833 PSYTX W PT W E/M 30 MIN: CPT | Mod: S$GLB,,, | Performed by: PSYCHIATRY & NEUROLOGY

## 2018-11-30 PROCEDURE — 99213 OFFICE O/P EST LOW 20 MIN: CPT | Mod: S$GLB,,, | Performed by: PSYCHIATRY & NEUROLOGY

## 2018-11-30 RX ORDER — ZOLPIDEM TARTRATE 10 MG/1
10 TABLET ORAL NIGHTLY PRN
Qty: 90 TABLET | Refills: 1 | Status: SHIPPED | OUTPATIENT
Start: 2018-11-30 | End: 2019-08-09

## 2018-11-30 RX ORDER — SERTRALINE HYDROCHLORIDE 100 MG/1
TABLET, FILM COATED ORAL
Qty: 135 TABLET | Refills: 1 | Status: SHIPPED | OUTPATIENT
Start: 2018-11-30 | End: 2019-06-25 | Stop reason: SDUPTHER

## 2018-11-30 RX ORDER — BUPROPION HYDROCHLORIDE 150 MG/1
450 TABLET ORAL DAILY
Qty: 270 TABLET | Refills: 1 | Status: SHIPPED | OUTPATIENT
Start: 2018-11-30 | End: 2019-06-25 | Stop reason: SDUPTHER

## 2018-11-30 RX ORDER — CLONAZEPAM 0.5 MG/1
TABLET ORAL
Qty: 90 TABLET | Refills: 1 | Status: SHIPPED | OUTPATIENT
Start: 2018-11-30 | End: 2019-08-09

## 2018-11-30 NOTE — PROGRESS NOTES
Outpatient Psychiatry Follow-Up Visit (MD/NP)    11/30/2018    Clinical Status of Patient:  Outpatient (Ambulatory)    Chief Complaint:  Schuyler Calles Jr. is a 33 y.o. male who presents today for follow-up of depression.  Met with patient.      Interval History and Content of Current Session:   Patient being seen for routine 6 month follow-up reporting that overall he has been doing well, however for several weeks starting in September up until recently he had been more anxious and having difficulty sleeping.  He had been having some nightmares.  He does not like to take Ambien.  He has taken more clonazepam during that period.  He is improved after getting his grandparents succession behind him.  He has been taking Klonopin, not uncommonly to deal with situations at work.  He is eating well.  He still enjoys activities including those with the person he is currently dating.  They have a cruise planned next month.  He continues to exercise some.    Patient's current therapist is not uncommonly out of town.  They do phone therapy at times.  He would like to establish a relationship with a therapist here at [facility].      Prior trials:  prozac - took it for 2-3 yrs before it started to work less well  seroquel - helped for a while, no side effects recalled.  Retrial caused weight gain.    cymbalta - inadequate at greater than 60 mg  abilify - did not help  Buspirone - fogginess  effexor - does not recall  lexapro- became ineffective      Psychotherapy:  · Target symptoms: depression  · Why chosen therapy is appropriate versus another modality: relevant to diagnosis, evidence based practice  · Outcome monitoring methods: self-report, observation  · Therapeutic intervention type: supportive psychotherapy  · Topics discussed/themes: work stress, building skills sets for symptom management, symptom recognition  · The patient's response to the intervention is motivated. The patient's progress toward treatment goals is  "fair.   · Duration of intervention: 16 minutes.    Review of Systems   · PSYCHIATRIC: Pertinant items are noted in the narrative.  · CONSTITUTIONAL: No weight gain or loss.     Past Medical, Family and Social History: The patient's past medical, family and social history have been reviewed and updated as appropriate within the electronic medical record - see encounter notes.    Compliance: yes    Side effects: None    Risk Parameters:  Patient reports no suicidal ideation  Patient reports no homicidal ideation  Patient reports no self-injurious behavior  Patient reports no violent behavior    Exam (detailed: at least 9 elements; comprehensive: all 15 elements)   Constitutional  Vitals:  Most recent vital signs, dated less than 90 days prior to this appointment, were reviewed.   Vitals:    11/30/18 0823   BP: 126/84   Pulse: 82   Weight: 89.3 kg (196 lb 12.2 oz)   Height: 5' 7" (1.702 m)        General:  age appropriate, casually dressed, neatly groomed     Musculoskeletal  Muscle Strength/Tone:  no dyskinesia, no tremor   Gait & Station:  non-ataxic     Psychiatric  Speech:  not pressured, rapid or loud, but clearly audible, no articulation errors, studdering or slurring   Mood:    Affect:  euthymic  appropriate, mood-congruent   Thought Process:  goal-directed, logical   Associations:  intact   Thought Content:  normal, no suicidality, no homicidality, delusions, or paranoia   Insight:  has awareness of illness   Judgement: behavior is adequate to circumstances   Orientation:  grossly intact   Memory: intact for content of interview   Language: grossly intact   Attention Span & Concentration:  able to focus   Fund of Knowledge:  intact and appropriate to age and level of education     Assessment and Diagnosis   Status/Progress: Based on the examination today, the patient's problem(s) is/are adequately but not ideally controlled.  New problems have not been presented today.   Co-morbidities are complicating " management of the primary condition.  There are no active rule-out diagnoses for this patient at this time.     General Impression: Pt with a h/o depression and prior treatment that included some augmentation with varying success.     Diagnosis:  Major Depressive Disorder recurrent in rem  GOLDIE      Intervention/Counseling/Treatment Plan   · The patient is not eager to make any medication changes.   · Referred to psychotherapy   · Continue Zoloft 150 mg daily  · Continue Wellbutrin 450mg daily  · Continue clonazepam as needed.  · Ambien as needed.      Return to Clinic:  6 months

## 2018-12-12 DIAGNOSIS — I10 ESSENTIAL HYPERTENSION: ICD-10-CM

## 2018-12-12 RX ORDER — CARVEDILOL 25 MG/1
25 TABLET ORAL 2 TIMES DAILY
Qty: 180 TABLET | Refills: 3 | Status: SHIPPED | OUTPATIENT
Start: 2018-12-12 | End: 2019-12-23

## 2018-12-12 RX ORDER — LOSARTAN POTASSIUM 50 MG/1
TABLET ORAL
Qty: 90 TABLET | Refills: 3 | Status: SHIPPED | OUTPATIENT
Start: 2018-12-12 | End: 2019-12-23

## 2019-02-12 ENCOUNTER — PATIENT MESSAGE (OUTPATIENT)
Dept: PSYCHIATRY | Facility: CLINIC | Age: 34
End: 2019-02-12

## 2019-02-13 ENCOUNTER — NURSE TRIAGE (OUTPATIENT)
Dept: ADMINISTRATIVE | Facility: CLINIC | Age: 34
End: 2019-02-13

## 2019-02-13 ENCOUNTER — HOSPITAL ENCOUNTER (EMERGENCY)
Facility: HOSPITAL | Age: 34
Discharge: PSYCHIATRIC OR SUBSTANCE USE FACILITY | End: 2019-02-14
Attending: EMERGENCY MEDICINE
Payer: COMMERCIAL

## 2019-02-13 VITALS
BODY MASS INDEX: 31.39 KG/M2 | OXYGEN SATURATION: 95 % | HEIGHT: 67 IN | TEMPERATURE: 98 F | DIASTOLIC BLOOD PRESSURE: 76 MMHG | SYSTOLIC BLOOD PRESSURE: 124 MMHG | WEIGHT: 200 LBS | HEART RATE: 73 BPM | RESPIRATION RATE: 18 BRPM

## 2019-02-13 DIAGNOSIS — F32.A DEPRESSION, UNSPECIFIED DEPRESSION TYPE: Primary | ICD-10-CM

## 2019-02-13 DIAGNOSIS — R45.851 SUICIDAL IDEATION: ICD-10-CM

## 2019-02-13 DIAGNOSIS — F41.9 ANXIETY: ICD-10-CM

## 2019-02-13 LAB
ALBUMIN SERPL BCP-MCNC: 4.6 G/DL
ALP SERPL-CCNC: 124 U/L
ALT SERPL W/O P-5'-P-CCNC: 32 U/L
AMPHET+METHAMPHET UR QL: NEGATIVE
ANION GAP SERPL CALC-SCNC: 13 MMOL/L
APAP SERPL-MCNC: <3 UG/ML
AST SERPL-CCNC: 32 U/L
BARBITURATES UR QL SCN>200 NG/ML: NEGATIVE
BASOPHILS # BLD AUTO: 0.04 K/UL
BASOPHILS NFR BLD: 0.4 %
BENZODIAZ UR QL SCN>200 NG/ML: NORMAL
BILIRUB SERPL-MCNC: 1.5 MG/DL
BILIRUB UR QL STRIP: NEGATIVE
BUN SERPL-MCNC: 20 MG/DL
BZE UR QL SCN: NEGATIVE
CALCIUM SERPL-MCNC: 10.1 MG/DL
CANNABINOIDS UR QL SCN: NEGATIVE
CHLORIDE SERPL-SCNC: 102 MMOL/L
CLARITY UR REFRACT.AUTO: ABNORMAL
CO2 SERPL-SCNC: 24 MMOL/L
COLOR UR AUTO: ABNORMAL
CREAT SERPL-MCNC: 1 MG/DL
CREAT UR-MCNC: 318 MG/DL
DIFFERENTIAL METHOD: ABNORMAL
EOSINOPHIL # BLD AUTO: 0.1 K/UL
EOSINOPHIL NFR BLD: 0.6 %
ERYTHROCYTE [DISTWIDTH] IN BLOOD BY AUTOMATED COUNT: 11.9 %
EST. GFR  (AFRICAN AMERICAN): >60 ML/MIN/1.73 M^2
EST. GFR  (NON AFRICAN AMERICAN): >60 ML/MIN/1.73 M^2
ETHANOL SERPL-MCNC: <10 MG/DL
GLUCOSE SERPL-MCNC: 72 MG/DL
GLUCOSE UR QL STRIP: NEGATIVE
HCT VFR BLD AUTO: 41.8 %
HGB BLD-MCNC: 14.9 G/DL
HGB UR QL STRIP: NEGATIVE
IMM GRANULOCYTES # BLD AUTO: 0.04 K/UL
IMM GRANULOCYTES NFR BLD AUTO: 0.4 %
KETONES UR QL STRIP: NEGATIVE
LEUKOCYTE ESTERASE UR QL STRIP: NEGATIVE
LYMPHOCYTES # BLD AUTO: 3.1 K/UL
LYMPHOCYTES NFR BLD: 31.8 %
MCH RBC QN AUTO: 32.6 PG
MCHC RBC AUTO-ENTMCNC: 35.6 G/DL
MCV RBC AUTO: 92 FL
METHADONE UR QL SCN>300 NG/ML: NEGATIVE
MONOCYTES # BLD AUTO: 0.8 K/UL
MONOCYTES NFR BLD: 8.5 %
NEUTROPHILS # BLD AUTO: 5.6 K/UL
NEUTROPHILS NFR BLD: 58.3 %
NITRITE UR QL STRIP: NEGATIVE
NRBC BLD-RTO: 0 /100 WBC
OPIATES UR QL SCN: NEGATIVE
PCP UR QL SCN>25 NG/ML: NEGATIVE
PH UR STRIP: 5 [PH] (ref 5–8)
PLATELET # BLD AUTO: 295 K/UL
PMV BLD AUTO: 11.4 FL
POTASSIUM SERPL-SCNC: 4 MMOL/L
PROT SERPL-MCNC: 8.3 G/DL
PROT UR QL STRIP: NEGATIVE
RBC # BLD AUTO: 4.57 M/UL
SODIUM SERPL-SCNC: 139 MMOL/L
SP GR UR STRIP: 1.02 (ref 1–1.03)
TOXICOLOGY INFORMATION: NORMAL
TSH SERPL DL<=0.005 MIU/L-ACNC: 1.26 UIU/ML
URN SPEC COLLECT METH UR: ABNORMAL
WBC # BLD AUTO: 9.64 K/UL

## 2019-02-13 PROCEDURE — 99284 EMERGENCY DEPT VISIT MOD MDM: CPT

## 2019-02-13 PROCEDURE — 80053 COMPREHEN METABOLIC PANEL: CPT

## 2019-02-13 PROCEDURE — 85025 COMPLETE CBC W/AUTO DIFF WBC: CPT

## 2019-02-13 PROCEDURE — 80329 ANALGESICS NON-OPIOID 1 OR 2: CPT

## 2019-02-13 PROCEDURE — 84443 ASSAY THYROID STIM HORMONE: CPT

## 2019-02-13 PROCEDURE — 99284 PR EMERGENCY DEPT VISIT,LEVEL IV: ICD-10-PCS | Mod: ,,, | Performed by: EMERGENCY MEDICINE

## 2019-02-13 PROCEDURE — 81003 URINALYSIS AUTO W/O SCOPE: CPT | Mod: 59

## 2019-02-13 PROCEDURE — 80307 DRUG TEST PRSMV CHEM ANLYZR: CPT

## 2019-02-13 PROCEDURE — 80320 DRUG SCREEN QUANTALCOHOLS: CPT

## 2019-02-13 PROCEDURE — 99284 EMERGENCY DEPT VISIT MOD MDM: CPT | Mod: ,,, | Performed by: EMERGENCY MEDICINE
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
## 2019-02-14 NOTE — ED NOTES
Patient accepted by [facility] ([address], [place]) for the service of Dr. Aguilar.  Report to be called to [phone], option 2. Nurse is Cindy.

## 2019-02-14 NOTE — ED NOTES
Assumed care of patient. Pt sleeping. Sitter at beside.  Patient in NAD and offers no complaints at this time. Bed placed in low/locked position, side rails up x 2,  plan of care provided to patient/family. Will continue to monitor.

## 2019-02-14 NOTE — ED NOTES
PEC received and faxed packet to [facility], [facility], [facility], [facility].

## 2019-02-14 NOTE — ED NOTES
Report and care to Trini ARDON. Awaiting transport. PSA remains at bs for q15m safety checks. No distress noted.

## 2019-02-14 NOTE — ED NOTES
Called Deandra García, pt's mother to notified her that patient was being transferred to [facility]. Patient's mother did not answer. Will attempt to call back later.
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
## 2019-02-14 NOTE — ED NOTES
Pt. Resting comfortably in stretcher with family at bs, no acute distress noted. psa at bs for q15m safety checks.
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
## 2019-02-14 NOTE — ED NOTES
Pt. Awaiting transport via [facility] to [facility]. Resting comfortably, no acute distress noted. Will continue to monitor. Family, Ofelia contacted and updated on POC.

## 2019-02-25 ENCOUNTER — PATIENT MESSAGE (OUTPATIENT)
Dept: PSYCHIATRY | Facility: CLINIC | Age: 34
End: 2019-02-25

## 2019-02-26 ENCOUNTER — HOSPITAL ENCOUNTER (OUTPATIENT)
Dept: CARDIOLOGY | Facility: CLINIC | Age: 34
Discharge: HOME | End: 2019-02-26
Payer: COMMERCIAL

## 2019-02-26 ENCOUNTER — OFFICE VISIT (OUTPATIENT)
Dept: PSYCHIATRY | Facility: CLINIC | Age: 34
End: 2019-02-26
Payer: COMMERCIAL

## 2019-02-26 VITALS
HEIGHT: 67 IN | DIASTOLIC BLOOD PRESSURE: 88 MMHG | SYSTOLIC BLOOD PRESSURE: 128 MMHG | WEIGHT: 189.69 LBS | BODY MASS INDEX: 29.77 KG/M2 | HEART RATE: 89 BPM

## 2019-02-26 DIAGNOSIS — Z79.899 ENCOUNTER FOR LONG-TERM (CURRENT) USE OF MEDICATIONS: ICD-10-CM

## 2019-02-26 DIAGNOSIS — F33.40 MDD (RECURRENT MAJOR DEPRESSIVE DISORDER) IN REMISSION: Primary | ICD-10-CM

## 2019-02-26 DIAGNOSIS — F41.1 GAD (GENERALIZED ANXIETY DISORDER): ICD-10-CM

## 2019-02-26 PROCEDURE — 93000 EKG 12-LEAD: ICD-10-PCS | Mod: S$GLB,,, | Performed by: INTERNAL MEDICINE

## 2019-02-26 PROCEDURE — 99214 OFFICE O/P EST MOD 30 MIN: CPT | Mod: S$GLB,,, | Performed by: PSYCHIATRY & NEUROLOGY

## 2019-02-26 PROCEDURE — 93000 ELECTROCARDIOGRAM COMPLETE: CPT | Mod: S$GLB,,, | Performed by: INTERNAL MEDICINE

## 2019-02-26 PROCEDURE — 99214 PR OFFICE/OUTPT VISIT, EST, LEVL IV, 30-39 MIN: ICD-10-PCS | Mod: S$GLB,,, | Performed by: PSYCHIATRY & NEUROLOGY

## 2019-02-26 PROCEDURE — 99999 PR PBB SHADOW E&M-EST. PATIENT-LVL II: CPT | Mod: PBBFAC,,, | Performed by: PSYCHIATRY & NEUROLOGY

## 2019-02-26 PROCEDURE — 99999 PR PBB SHADOW E&M-EST. PATIENT-LVL II: ICD-10-PCS | Mod: PBBFAC,,, | Performed by: PSYCHIATRY & NEUROLOGY
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
## 2019-02-27 ENCOUNTER — DOCUMENTATION ONLY (OUTPATIENT)
Dept: PSYCHIATRY | Facility: HOSPITAL | Age: 34
End: 2019-02-27

## 2019-02-27 NOTE — PSYCH
Name: Schuyler Calles Jr.                               MRN: [id number]    Referred Program: Behavioral Medicine Unit    Contact Phone Number: [phone] (home) [phone] (work)    Referring Provider: Devendra    Psychosocial Stressors: Depression, suicide attempt    Pt referred by Dr. Ramsay. Sw discussed program structure and insurance benefits. Opt expressed understanding. Pt scheduled to begin program on 3/1/2019.

## 2019-02-28 ENCOUNTER — DOCUMENTATION ONLY (OUTPATIENT)
Dept: PSYCHIATRY | Facility: HOSPITAL | Age: 34
End: 2019-02-28

## 2019-02-28 NOTE — PROGRESS NOTES
SW contacted pt to review admit instructions and confirm BMU appt for 8:00am. Pt confirmed appt and expressed understanding.

## 2019-03-01 ENCOUNTER — HOSPITAL ENCOUNTER (OUTPATIENT)
Dept: PSYCHIATRY | Facility: HOSPITAL | Age: 34
Discharge: HOME | End: 2019-03-01
Attending: PSYCHIATRY & NEUROLOGY
Payer: COMMERCIAL

## 2019-03-01 VITALS
HEIGHT: 67 IN | DIASTOLIC BLOOD PRESSURE: 96 MMHG | TEMPERATURE: 98 F | SYSTOLIC BLOOD PRESSURE: 138 MMHG | RESPIRATION RATE: 16 BRPM | HEART RATE: 97 BPM | BODY MASS INDEX: 29.9 KG/M2 | WEIGHT: 190.5 LBS

## 2019-03-01 DIAGNOSIS — F32.A DEPRESSION, UNSPECIFIED DEPRESSION TYPE: ICD-10-CM

## 2019-03-01 DIAGNOSIS — F33.1 MDD (MAJOR DEPRESSIVE DISORDER), RECURRENT EPISODE, MODERATE: Primary | ICD-10-CM

## 2019-03-01 LAB
AMPHET+METHAMPHET UR QL: NEGATIVE
BARBITURATES UR QL SCN>200 NG/ML: NEGATIVE
BENZODIAZ UR QL SCN>200 NG/ML: NEGATIVE
BZE UR QL SCN: NEGATIVE
CANNABINOIDS UR QL SCN: NEGATIVE
CREAT UR-MCNC: 43 MG/DL
ETHANOL UR-MCNC: <10 MG/DL
METHADONE UR QL SCN>300 NG/ML: NEGATIVE
OPIATES UR QL SCN: NEGATIVE
PCP UR QL SCN>25 NG/ML: NEGATIVE
TOXICOLOGY INFORMATION: NORMAL

## 2019-03-01 PROCEDURE — 90853 GROUP PSYCHOTHERAPY: CPT

## 2019-03-01 PROCEDURE — 80307 DRUG TEST PRSMV CHEM ANLYZR: CPT

## 2019-03-01 PROCEDURE — 90853 PR GROUP PSYCHOTHERAPY: ICD-10-PCS | Mod: 59,,, | Performed by: PSYCHOLOGIST

## 2019-03-01 PROCEDURE — 99222 PR INITIAL HOSPITAL CARE,LEVL II: ICD-10-PCS | Mod: ,,, | Performed by: PSYCHIATRY & NEUROLOGY

## 2019-03-01 PROCEDURE — 99222 1ST HOSP IP/OBS MODERATE 55: CPT | Mod: ,,, | Performed by: PSYCHIATRY & NEUROLOGY

## 2019-03-01 PROCEDURE — 90853 GROUP PSYCHOTHERAPY: CPT | Mod: 59,,, | Performed by: PSYCHOLOGIST
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
## 2019-03-01 NOTE — PROGRESS NOTES
Group Psychotherapy (PhD/LCSW)    Site: [facility]    Clinical status of patient: Intensive Outpatient Program (IOP)    Date: 3/1/2019    Group Focus: Psychodynamic Group Psychotherapy    Length of service: 55416 - 45-50 minutes    Number of patients in attendance: 8    Referred by: Behavioral Medicine Unit Treatment Team    Target symptoms: Depression    Patient's response to treatment: Active Listening and Self-disclosure    Progress toward goals: Progressing adequately    Interval History: Pt introduced himself appropriately to the group and also asked for help in planning out his weekend.    Diagnosis: MDD    Plan: Continue treatment on BMU

## 2019-03-01 NOTE — PLAN OF CARE
03/01/19 1000   Activity/Group Therapy Checklist   Group Educational   Attendance Attended   Follows Direction Followed directions   Group Interactions/Observations Interacted appropriately;Sharing   Affect/Mood Range Normal range   Affect/Mood Display Appropriate   Goal Progression Progressing

## 2019-03-01 NOTE — TREATMENT PLAN
"[facility]  BEHAVIORAL MEDICINE UNIT  INTERDISCIPLINARY TREATMENT PLAN  INTENSIVE OUTPATIENT PROGRAM    INTERDISCIPLINARY  TREATMENT TEAM:    Angelo Ramsay M.D., Psychiatrist      Chandrika Esquivel, Ph.D., Clinical Psychologist    Ivtete Adler R.N., Registered Nurse    Yanet Moore, [facility],     Tomy Downey [facility],       Resident:    (Signatures scanned into record separately).      ESTIMATED LOS:  2 weeks      The patient has reviewed the treatment plan with staff and has signed the "Patient Responsibilities" form.    (Patient signature scanned into record separately).      TREATMENT PLAN    DIAGNOSIS:      Patient Education Needs/Barriers to Learning (i.e., Language, Reading, Comprehension): None        Support/Advocacy Services/Needs (i.e., Financial, Transportation, Medications): None      Community Resources (i.e., Alcoholics Anonymous, Al Anon): None    Patients Identified Goals:  1. Be able to adjust my expectations in others so that I am not constantly disappointed  2. I want to have self worth; I want to love myself   3. Happiness that is not dependent on others  4. Stop pushing people away out of fear    Coping Skills:  1. Try to do something to distract my mind so that I can't focus on how bad I feel  2. Call a friend to change my thoughts through distraction  3. Watch TV or read  4. Do something with manual labor (work out, cut grass, etc.)        Strengths:  1. Have self awareness to identify ways for growth  2. Appreciate constructive criticism  3. Huge heart, give a lot emotionally  4. Hard working and very loyal      Limitations:  1. Repetitive negative thoughts  2. Unable to step back and put situations in perspective  3. Emotional reactor resulting often in guilt and regret  4. People pleasers, put others before myself      Goals and Objectives:  1. Goal: Attend and participate in all groups   Objective measure: Progress notes indicating active " listening,    self-disclosure, feedback   Time frame to reach goal: Each day    2. Goal: Medication management   Objective measure: Physician progress note indicating improved medication   status   Time frame to reach goal: By discharge    3. Goal: Reduce depression   Objective measure: Physician progress note indicating depression is improved   Time frame to reach goal: By discharge    4.  Goal: Reduce anxiety   Objective measure: Physician progress note indicating anxiety is improved   Time frame to reach goal: By discharge    5. Goal: Develop stress coping skills   Objective measure: Patient self-report of improved coping   Time frame to reach goal: By discharge    6.  Goal: Address health problems   Objective measure: Physician progress note regarding management of health   problems   Time frame to reach goal: Within first week of treatment    7. Goal: Assess level of pain   Objective measure: Physician progress note regarding patient's self-reported pain   Time frame to reach goal: Within first week of treatment    8. Goal:    Objective measure:    Time frame to reach goal:    9. Goal:    Objective measure:    Time frame to reach goal:     10. Goal:    Objective measure:    Time frame to reach goal:       Group Interventions:    Psychodynamic Group Psychotherapy - 1 hour, 5 times per week  Goals: 1. Utilize group empathy and support for problem solving; 2. Apply stress management, communication, and assertiveness skills to personal issues; 3. Discuss mental health symptoms and explore strategies for coping; 4. Discuss ways to change lifestyle to maintain better emotional health.    Communication Skills - 1 hour, 2 times per week  Goals: 1. Learn rules of effective communication; 2. Improve listening skills; 3. Practice clear communication.    Nutrition and Health - 1 hour, 1 time per week  Goals: 1. Explore impact that nutrition has on health; 2. Identify positive nutritional choices; 3. Explore how nutrition  relates to stress tolerance.    Personal Growth - 1 hour, 2 times per week  Goals: 1. Discuss the development of self; 2. Create personal awareness and insight; 3. Explore a variety of psycho-educational techniques.    Promoting Healthy Lifestyles - 1 hour, 1 time per week  Goals: 1. Understand the Biopsychosocial Model of Health; 2. Develop insight into how mental health can impact other dimensions of health; 3. Develop appropriate health promotion strategies.    Rational Emotive Therapy (RET) - 1 hour, 1 time per week  Goals: 1. Learn an action-oriented psychotherapy called RET; 2. Focus on identifying, challenging, and replacing self-defeating thoughts and beliefs; 3. Explore how healthier thinking can lead to healthier emotional well-being.    Relationship Dynamics - 1 hour, 1 time per week  Goals: 1. Learn about factors that shape relationships; 2. Understand the central role of relationships in personal well-being; 3. Learn how to improve all relationships.    Relaxation Training - 1 hour, 3 times per week  Goals: 1. Learn about and implement various techniques for releasing physical tension from the body.    Spirituality - 1 hour, 1 time per week  Goals: 1. Discuss and reflect on the process of seeking peace and comfort; 2. Identify healthy ways of exploring spirituality.    Stress Management - 1 hour, 4 times per week  Goals: 1. Identify types and levels of stress; 2. Identify and change maladaptive beliefs and behaviors; 3. Identify and practice techniques of stress management.

## 2019-03-01 NOTE — PROGRESS NOTES
Group Psychotherapy (PhD/LCSW)    Site: [facility]    Clinical status of patient: Intensive Outpatient Program (IOP)    Date: 3/1/2019    Group Focus: Stress Management    Length of service: 40293 - 45-50 minutes    Number of patients in attendance: 14    Referred by: Behavioral Medicine Unit Treatment Team    Target symptoms: Depression    Patient's response to treatment: Active Listening    Progress toward goals: Progressing adequately    Interval History: Group learned mindfulness techniques (breath, muscles/body) to improve present-moment awareness, impulsive behavior tendencies, and tolerance of various emotional states.    Diagnosis: MDD    Plan: Continue treatment on BMU

## 2019-03-04 ENCOUNTER — HOSPITAL ENCOUNTER (OUTPATIENT)
Dept: PSYCHIATRY | Facility: HOSPITAL | Age: 34
Discharge: HOME | End: 2019-03-04
Attending: PSYCHIATRY & NEUROLOGY
Payer: COMMERCIAL

## 2019-03-04 VITALS — DIASTOLIC BLOOD PRESSURE: 78 MMHG | HEART RATE: 87 BPM | RESPIRATION RATE: 16 BRPM | SYSTOLIC BLOOD PRESSURE: 140 MMHG

## 2019-03-04 DIAGNOSIS — F33.1 MDD (MAJOR DEPRESSIVE DISORDER), RECURRENT EPISODE, MODERATE: Primary | ICD-10-CM

## 2019-03-04 DIAGNOSIS — F32.A DEPRESSION, UNSPECIFIED DEPRESSION TYPE: ICD-10-CM

## 2019-03-04 PROCEDURE — 90853 GROUP PSYCHOTHERAPY: CPT

## 2019-03-04 PROCEDURE — 90853 GROUP PSYCHOTHERAPY: CPT | Mod: ,,, | Performed by: PSYCHOLOGIST

## 2019-03-04 PROCEDURE — 90853 PR GROUP PSYCHOTHERAPY: ICD-10-PCS | Mod: ,,, | Performed by: PSYCHOLOGIST

## 2019-03-04 PROCEDURE — 90853 GROUP PSYCHOTHERAPY: CPT | Performed by: SOCIAL WORKER

## 2019-03-04 NOTE — PROGRESS NOTES
Group Psychotherapy (PhD/LCSW)    Site: [facility]    Clinical status of patient: Intensive Outpatient Program (IOP)    Date: 3/4/2019    Group Focus: Psychodynamic Group Psychotherapy    Length of service: 90819 - 45-50 minutes    Number of patients in attendance: 8    Referred by: Behavioral Medicine Unit Treatment Team    Target symptoms: Depression    Patient's response to treatment: Active Listening and Self-disclosure    Progress toward goals: Progressing adequately    Interval History: Pt reports that he did get out some this weekend. He is noticing some shifts in mood and the presence of occasional irrational thoughts, and was encouraged by group that he is noticing these things better than just a few weeks ago and has motivation to work on them.    Diagnosis: MDD    Plan: Continue treatment on BMU

## 2019-03-04 NOTE — PLAN OF CARE
03/04/19 1000   Activity/Group Therapy Checklist   Group Current Events  (Weekend Check In)   Attendance Attended   Follows Direction Followed directions   Group Interactions/Observations Interacted appropriately   Affect/Mood Range Normal range   Affect/Mood Display Appropriate   Goal Progression Progressing

## 2019-03-04 NOTE — PROGRESS NOTES
Group Psychotherapy (PhD/LCSW)    Site: [facility]    Clinical status of patient: Intensive Outpatient Program (IOP)    Date: 3/4/2019    Group Focus: Communication Skills       Length of service: 65670 - 45-50 minutes    Number of patients in attendance: 15    Referred by: Behavioral Medicine Unit Treatment Team    Target symptoms: Depression    Patient's response to treatment: Active Listening and Self-disclosure    Progress toward goals: Progressing adequately    Interval History: Reviewed basic communication skills (I-messages; Reflective Listening). Illustrated their value and demonstrated their use through vignettes, modeling, and role play). Worked with interpersonal problems currently facing group members to show how I-messages and Reflective Listening can enhance communication about same. Pt worked on developing an I-message to give to his friend about a current issue.     Diagnosis: MDD    Plan: Continue treatment on BMU

## 2019-03-06 ENCOUNTER — HOSPITAL ENCOUNTER (OUTPATIENT)
Dept: PSYCHIATRY | Facility: HOSPITAL | Age: 34
Discharge: HOME | End: 2019-03-06
Attending: PSYCHIATRY & NEUROLOGY
Payer: COMMERCIAL

## 2019-03-06 DIAGNOSIS — F33.1 MDD (MAJOR DEPRESSIVE DISORDER), RECURRENT EPISODE, MODERATE: Primary | ICD-10-CM

## 2019-03-06 PROCEDURE — 90853 GROUP PSYCHOTHERAPY: CPT

## 2019-03-06 PROCEDURE — 90853 PR GROUP PSYCHOTHERAPY: ICD-10-PCS | Mod: ,,, | Performed by: PSYCHOLOGIST

## 2019-03-06 PROCEDURE — 90853 GROUP PSYCHOTHERAPY: CPT | Mod: ,,, | Performed by: PSYCHOLOGIST

## 2019-03-06 NOTE — PROGRESS NOTES
Group Psychotherapy (PhD/LCSW)    Site: [facility]    Clinical status of patient: Intensive Outpatient Program (IOP)    Date: 3/6/2019    Group Focus: DBT-Based Group Psychotherapy    Length of service: 98696 - 45-50 minutes    Number of patients in attendance: 8    Referred by: Behavioral Medicine Unit Treatment Team    Target symptoms: Depression    Patient's response to treatment: Active Listening and Self-disclosure    Progress toward goals: Progressing adequately    Interval History: Session focus was Interpersonal Effectiveness:  Validation (Part 2).  Patients practiced validation of others through role-play and examples.    Diagnosis: MDD    Plan: Continue treatment on BMU

## 2019-03-06 NOTE — PROGRESS NOTES
Group Psychotherapy (PhD/LCSW)    Site: [facility]    Clinical status of patient: Intensive Outpatient Program (IOP)    Date: 3/6/2019    Group Focus: Psychodynamic Group Psychotherapy    Length of service: 12015 - 45-50 minutes    Number of patients in attendance: 6    Referred by: Behavioral Medicine Unit Treatment Team    Target symptoms: Depression    Patient's response to treatment: Active Listening and Self-disclosure    Progress toward goals: Progressing adequately    Interval History: Pt reported that he did not accomplish goal of hanging his artwork on the wall. He discussed becoming more depressed yesterday and this morning about the way his behavior undermined his recent relationship. He acknowledged feeling hopeless but denied suicidal ideation. Group encouraged him to recognize and make use of the fact that he has greater insight into his relationship patterns than in the past and can use that insight to help in future relationships.     Diagnosis: MDD    Plan: Continue treatment on BMU

## 2019-03-07 ENCOUNTER — HOSPITAL ENCOUNTER (OUTPATIENT)
Dept: PSYCHIATRY | Facility: HOSPITAL | Age: 34
Discharge: HOME | End: 2019-03-07
Attending: PSYCHIATRY & NEUROLOGY
Payer: COMMERCIAL

## 2019-03-07 ENCOUNTER — DOCUMENTATION ONLY (OUTPATIENT)
Dept: PSYCHIATRY | Facility: HOSPITAL | Age: 34
End: 2019-03-07

## 2019-03-07 VITALS — RESPIRATION RATE: 18 BRPM | HEART RATE: 89 BPM | DIASTOLIC BLOOD PRESSURE: 100 MMHG | SYSTOLIC BLOOD PRESSURE: 144 MMHG

## 2019-03-07 DIAGNOSIS — F33.1 MDD (MAJOR DEPRESSIVE DISORDER), RECURRENT EPISODE, MODERATE: ICD-10-CM

## 2019-03-07 DIAGNOSIS — F32.A DEPRESSION, UNSPECIFIED DEPRESSION TYPE: Primary | ICD-10-CM

## 2019-03-07 DIAGNOSIS — F41.1 GAD (GENERALIZED ANXIETY DISORDER): ICD-10-CM

## 2019-03-07 PROCEDURE — 90853 PR GROUP PSYCHOTHERAPY: ICD-10-PCS | Mod: ,,, | Performed by: PSYCHOLOGIST

## 2019-03-07 PROCEDURE — 90853 GROUP PSYCHOTHERAPY: CPT | Mod: ,,, | Performed by: PSYCHOLOGIST

## 2019-03-07 PROCEDURE — 90853 GROUP PSYCHOTHERAPY: CPT
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
## 2019-03-07 NOTE — PROGRESS NOTES
Group Psychotherapy (PhD/LCSW)    Site: [facility]    Clinical status of patient: Intensive Outpatient Program (IOP)    Date: 3/7/2019    Group Focus: DBT-Based Group Psychotherapy    Length of service: 71491 - 45-50 minutes    Number of patients in attendance: 11    Referred by: Behavioral Medicine Unit Treatment Team    Target symptoms: Depression    Patient's response to treatment: Active Listening and Self-disclosure    Progress toward goals: Progressing adequately    Interval History: Session focus was Interpersonal Effectiveness:  DEAR MAN.  Patients were encouraged to exercise skillfulness during interactions by using this framework.    Diagnosis: MDD    Plan: Continue treatment on BMU

## 2019-03-07 NOTE — PROGRESS NOTES
"Group Psychotherapy (PhD/LCSW)    Site: [facility]    Clinical status of patient: Intensive Outpatient Program (IOP)    Date: 3/7/2019    Group Focus: Strength Training      Length of service: 89990 - 45-50 minutes    Number of patients in attendance: 6    Referred by: Behavioral Medicine Unit Treatment Team    Target symptoms: Depression    Patient's response to treatment: Active Listening and Self-disclosure    Progress toward goals: Progressing adequately    Interval History: Discussed the value of "forgiveness" towards others and towards oneself. Pt noted that it is harder for him to forgive himself than others. Discussed ways of framing one's behavior to allow for greater self-acceptance and self-forgiveness while maintaining a commitment to personal growth     Diagnosis: MDD    Plan: Continue treatment on BMU        "

## 2019-03-07 NOTE — PROGRESS NOTES
Group Psychotherapy (PhD/LCSW)    Site: [facility]    Clinical status of patient: Intensive Outpatient Program (IOP)    Date: 3/7/2019    Group Focus: Psychodynamic Group Psychotherapy    Length of service: 12566 - 45-50 minutes    Number of patients in attendance: 6    Referred by: Behavioral Medicine Unit Treatment Team    Target symptoms: Depression    Patient's response to treatment: Active Listening and Self-disclosure    Progress toward goals: Progressing adequately    Interval History: Pt reports that he has been using the assigned workbook and finding some benefit from it. He noticed the ways he was able to pull himself out of a difficult mood yesterday.    Diagnosis: MDD    Plan: Continue treatment on BMU

## 2019-03-07 NOTE — PROGRESS NOTES
Pt called at 8:58am reporting that he will be late to tx today. Reported expected arrival time at 10:15am today

## 2019-03-08 ENCOUNTER — HOSPITAL ENCOUNTER (OUTPATIENT)
Dept: PSYCHIATRY | Facility: HOSPITAL | Age: 34
Discharge: HOME | End: 2019-03-08
Attending: PSYCHIATRY & NEUROLOGY
Payer: COMMERCIAL

## 2019-03-08 DIAGNOSIS — F33.1 MDD (MAJOR DEPRESSIVE DISORDER), RECURRENT EPISODE, MODERATE: Primary | ICD-10-CM

## 2019-03-08 PROCEDURE — 99232 PR SUBSEQUENT HOSPITAL CARE,LEVL II: ICD-10-PCS | Mod: ,,, | Performed by: PSYCHIATRY & NEUROLOGY

## 2019-03-08 PROCEDURE — 90853 GROUP PSYCHOTHERAPY: CPT

## 2019-03-08 PROCEDURE — 90853 PR GROUP PSYCHOTHERAPY: ICD-10-PCS | Mod: 59,,, | Performed by: PSYCHOLOGIST

## 2019-03-08 PROCEDURE — 90853 GROUP PSYCHOTHERAPY: CPT | Mod: 59,,, | Performed by: PSYCHOLOGIST

## 2019-03-08 PROCEDURE — 99232 SBSQ HOSP IP/OBS MODERATE 35: CPT | Mod: ,,, | Performed by: PSYCHIATRY & NEUROLOGY

## 2019-03-08 RX ORDER — IMIPRAMINE HYDROCHLORIDE 10 MG/1
TABLET, FILM COATED ORAL
Qty: 90 TABLET | Refills: 0 | Status: SHIPPED | OUTPATIENT
Start: 2019-03-08 | End: 2019-03-18 | Stop reason: SDUPTHER

## 2019-03-08 RX ORDER — PRAZOSIN HYDROCHLORIDE 2 MG/1
2 CAPSULE ORAL NIGHTLY
Qty: 30 CAPSULE | Refills: 0 | Status: ON HOLD | OUTPATIENT
Start: 2019-03-08 | End: 2020-01-06
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
## 2019-03-08 NOTE — PROGRESS NOTES
Group Psychotherapy (PhD/LCSW)    Site: [facility]    Clinical status of patient: Intensive Outpatient Program (IOP)    Date: 3/8/2019    Group Focus: Psychodynamic Group Psychotherapy    Length of service: 58952 - 45-50 minutes    Number of patients in attendance: 5    Referred by: Behavioral Medicine Unit Treatment Team    Target symptoms: Depression    Patient's response to treatment: Active Listening and Self-disclosure    Progress toward goals: Progressing adequately    Interval History: Pt engaged in weekend planning discussion, and reports he plans to reunite with an old friend this weekend.    Diagnosis: MDD    Plan: Continue treatment on BMU

## 2019-03-08 NOTE — PROGRESS NOTES
Group Psychotherapy (PhD/LCSW)    Site: [facility]    Clinical status of patient: Intensive Outpatient Program (IOP)    Date: 3/8/2019    Group Focus: Stress Management    Length of service: 30819 - 45-50 minutes    Number of patients in attendance: 11    Referred by: Behavioral Medicine Unit Treatment Team    Target symptoms: Depression    Patient's response to treatment: Active Listening    Progress toward goals: Progressing adequately    Interval History: Group learned mindfulness techniques (breath, senses, eating) to improve present-moment awareness, impulsive behavior tendencies, and tolerance of various emotional states.    Diagnosis: MDD    Plan: Continue treatment on BMU

## 2019-03-08 NOTE — PLAN OF CARE
03/08/19 1000   Activity/Group Therapy Checklist   Group Educational   Attendance Attended   Follows Direction Followed directions   Group Interactions/Observations Interacted appropriately;Sharing;Supportive  (Leadership characteristics. Discussed radical acceptance and relationship with ex's sister)   Affect/Mood Range Normal range   Affect/Mood Display Appropriate   Goal Progression Progressing
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
## 2019-03-11 ENCOUNTER — HOSPITAL ENCOUNTER (OUTPATIENT)
Dept: PSYCHIATRY | Facility: HOSPITAL | Age: 34
Discharge: HOME | End: 2019-03-11
Attending: PSYCHIATRY & NEUROLOGY
Payer: COMMERCIAL

## 2019-03-11 DIAGNOSIS — F32.A DEPRESSION, UNSPECIFIED DEPRESSION TYPE: Primary | ICD-10-CM

## 2019-03-11 PROCEDURE — 90853 GROUP PSYCHOTHERAPY: CPT | Mod: ,,, | Performed by: PSYCHOLOGIST

## 2019-03-11 PROCEDURE — 90853 PR GROUP PSYCHOTHERAPY: ICD-10-PCS | Mod: ,,, | Performed by: PSYCHOLOGIST

## 2019-03-11 PROCEDURE — 90853 GROUP PSYCHOTHERAPY: CPT

## 2019-03-11 PROCEDURE — 90853 GROUP PSYCHOTHERAPY: CPT | Performed by: SOCIAL WORKER

## 2019-03-11 NOTE — PROGRESS NOTES
Group Psychotherapy (PhD/LCSW)    Site: [facility]    Clinical status of patient: Intensive Outpatient Program (IOP)    Date: 3/11/2019    Group Focus: Communication Skills       Length of service: 15521 - 45-50 minutes    Number of patients in attendance: 13    Referred by: Behavioral Medicine Unit Treatment Team    Target symptoms: Depression    Patient's response to treatment: Active Listening and Self-disclosure    Progress toward goals: Progressing adequately    Interval History:  Discussed the ways that differences in gender, fx of origin background, ethnicity, and age can impair the communication process when not taken into account.     Diagnosis: MDD    Plan: Continue treatment on BMU

## 2019-03-11 NOTE — PLAN OF CARE
03/11/19 1000   Activity/Group Therapy Checklist   Group Goals/Reflection  (Values)   Attendance Attended   Follows Direction Followed directions   Group Interactions/Observations Interacted appropriately   Affect/Mood Range Normal range   Affect/Mood Display Appropriate   Goal Progression Progressing

## 2019-03-11 NOTE — PROGRESS NOTES
"Group Psychotherapy (PhD/LCSW)    Site: [facility]    Clinical status of patient: Intensive Outpatient Program (IOP)    Date: 3/11/2019    Group Focus: Psychodynamic Group Psychotherapy    Length of service: 23646 - 45-50 minutes    Number of patients in attendance: 6    Referred by: Behavioral Medicine Unit Treatment Team    Target symptoms: Depression    Patient's response to treatment: Active Listening and Self-disclosure    Progress toward goals: Progressing adequately    Interval History: Pt appeared anxious today as he discussed his desire to get "closure" from his ex, and his belief that he cannot move on until his ex speaks to him. Briefly discussed how he can shift perspective but will discuss more in group tomorrow.    Diagnosis: MDD    Plan: Continue treatment on BMU        "

## 2019-03-12 ENCOUNTER — DOCUMENTATION ONLY (OUTPATIENT)
Dept: PSYCHIATRY | Facility: HOSPITAL | Age: 34
End: 2019-03-12

## 2019-03-12 NOTE — PROGRESS NOTES
"Sw placed a call to pt to inquire about attending program today. Pt informed this sw that "he has a migraine and has not been able to sleep and will not be able to make it in today." Sw voiced verbal understanding and confirmed pt attending tomorrow.        "

## 2019-03-13 ENCOUNTER — HOSPITAL ENCOUNTER (OUTPATIENT)
Dept: PSYCHIATRY | Facility: HOSPITAL | Age: 34
Discharge: HOME | End: 2019-03-13
Attending: PSYCHIATRY & NEUROLOGY
Payer: COMMERCIAL

## 2019-03-13 DIAGNOSIS — F32.A DEPRESSION, UNSPECIFIED DEPRESSION TYPE: Primary | ICD-10-CM

## 2019-03-13 PROCEDURE — 90853 PR GROUP PSYCHOTHERAPY: ICD-10-PCS | Mod: ,,, | Performed by: PSYCHOLOGIST

## 2019-03-13 PROCEDURE — 90853 PR GROUP PSYCHOTHERAPY: ICD-10-PCS | Mod: ,,, | Performed by: PSYCHIATRY & NEUROLOGY

## 2019-03-13 PROCEDURE — 90853 GROUP PSYCHOTHERAPY: CPT | Mod: ,,, | Performed by: PSYCHOLOGIST

## 2019-03-13 PROCEDURE — 90853 GROUP PSYCHOTHERAPY: CPT | Mod: ,,, | Performed by: PSYCHIATRY & NEUROLOGY

## 2019-03-13 PROCEDURE — 90853 GROUP PSYCHOTHERAPY: CPT

## 2019-03-13 NOTE — PROGRESS NOTES
Group Psychotherapy (PhD/LCSW)    Site: [facility]    Clinical status of patient: Intensive Outpatient Program (IOP)    Date: 3/13/2019    Group Focus: Psychodynamic Group Psychotherapy    Length of service: 95418 - 45-50 minutes    Number of patients in attendance: 7    Referred by: Behavioral Medicine Unit Treatment Team    Target symptoms: Depression    Patient's response to treatment: Active Listening and Self-disclosure    Progress toward goals: Progressing adequately    Interval History: Pt processed his thoughts about his breakup and asked for help as to how he can stay more grounded. He was receptive to ideas and feedback.    Diagnosis: MDD    Plan: Continue treatment on BMU

## 2019-03-13 NOTE — PROGRESS NOTES
Group Psychotherapy (PhD/LCSW)    Site: [facility]    Clinical status of patient: Intensive Outpatient Program (IOP)    Date: 3/13/2019    Group Focus: DBT-Based Group Psychotherapy    Length of service: 02196 - 45-50 minutes    Number of patients in attendance: 12    Referred by: Behavioral Medicine Unit Treatment Team    Target symptoms: Depression    Patient's response to treatment: Active Listening and Self-disclosure    Progress toward goals: Progressing adequately    Interval History: Session focus was Interpersonal Effectiveness:  DEAR MAN, GIVE, FAST.  Patients reviewed this framework and filled out a worksheet for a personal interaction.  Patients were provided with opportunities to collaborate with others and role-play in session.    Diagnosis: MDD    Plan: Continue treatment on BMU

## 2019-03-13 NOTE — PATIENT CARE CONFERENCE
Problem List:     Major Depressive d/o          Pt staffed with the treatment team today. Staff discussed pt continued depression and anxiety. Staff discussed pt recent depressive episodes revolving around ex partner and bolstering of mood as of late. Staff discussed pt benefit from continued therapy and DBT. Staff discussed pt motivation in group and friendly presentation with group members and staff.     Follow Up: Medication Management -Dr. Ramsay  Psychotherapy -Out pt therapist- BARAK Stark     Staff Present:  MD Chandrika Byrd, Ph.D  BARAK Carter LCSW

## 2019-03-14 ENCOUNTER — HOSPITAL ENCOUNTER (OUTPATIENT)
Dept: PSYCHIATRY | Facility: HOSPITAL | Age: 34
Discharge: HOME | End: 2019-03-14
Attending: PSYCHIATRY & NEUROLOGY
Payer: COMMERCIAL

## 2019-03-14 DIAGNOSIS — F33.1 MDD (MAJOR DEPRESSIVE DISORDER), RECURRENT EPISODE, MODERATE: Primary | ICD-10-CM

## 2019-03-14 DIAGNOSIS — F32.A DEPRESSION, UNSPECIFIED DEPRESSION TYPE: ICD-10-CM

## 2019-03-14 PROCEDURE — 90853 GROUP PSYCHOTHERAPY: CPT | Mod: ,,, | Performed by: PSYCHOLOGIST

## 2019-03-14 PROCEDURE — 99233 PR SUBSEQUENT HOSPITAL CARE,LEVL III: ICD-10-PCS | Mod: ,,, | Performed by: PSYCHIATRY & NEUROLOGY

## 2019-03-14 PROCEDURE — 90853 PR GROUP PSYCHOTHERAPY: ICD-10-PCS | Mod: 59,,, | Performed by: PSYCHOLOGIST

## 2019-03-14 PROCEDURE — 90853 PR GROUP PSYCHOTHERAPY: ICD-10-PCS | Mod: ,,, | Performed by: PSYCHOLOGIST

## 2019-03-14 PROCEDURE — 90853 GROUP PSYCHOTHERAPY: CPT | Mod: 59,,, | Performed by: PSYCHOLOGIST

## 2019-03-14 PROCEDURE — 99233 SBSQ HOSP IP/OBS HIGH 50: CPT | Mod: ,,, | Performed by: PSYCHIATRY & NEUROLOGY

## 2019-03-14 PROCEDURE — 90853 GROUP PSYCHOTHERAPY: CPT

## 2019-03-14 NOTE — PROGRESS NOTES
Group Psychotherapy (PhD/LCSW)    Site: [facility]    Clinical status of patient: Intensive Outpatient Program (IOP)    Date: 3/14/2019    Group Focus: The Dynamics of Relationship        Length of service: 36927 - 45-50 minutes    Number of patients in attendance: 13    Referred by: Behavioral Medicine Unit Treatment Team    Target symptoms: Depression    Patient's response to treatment: Active Listening     Progress toward goals: Progressing adequately    Interval History: Discussed strategies for altering dysfunctional patterns of relationship through focusing on changing one's own behavior rather than trying to change the behavior of the other person. Also illustrated the way humor can be used in mitigating relationship tension.     Diagnosis: MDD    Plan: Continue treatment on BMU

## 2019-03-14 NOTE — PROGRESS NOTES
Group Psychotherapy (PhD/LCSW)    Site: [facility]    Clinical status of patient: Intensive Outpatient Program (IOP)    Date: 3/14/2019    Group Focus: Psychodynamic Group Psychotherapy    Length of service: 10254 - 45-50 minutes    Number of patients in attendance: 5    Referred by: Behavioral Medicine Unit Treatment Team    Target symptoms: Depression    Patient's response to treatment: Active Listening and Self-disclosure    Progress toward goals: Progressing adequately    Interval History: Pt asked for help to resist urges to go to his ex's house now that he has his car back. He got nice feedback and took it in well.    Diagnosis: MDD    Plan: Continue treatment on BMU

## 2019-03-14 NOTE — PROGRESS NOTES
"Group Psychotherapy (PhD/LCSW)    Site: [facility]    Clinical status of patient: Intensive Outpatient Program (IOP)    Date: 3/14/2019    Group Focus:  Strength Training      Length of service: 96786 - 45-50 minutes    Number of patients in attendance: 5    Referred by: Behavioral Medicine Unit Treatment Team    Target symptoms: Depression    Patient's response to treatment: Active Listening and Self-disclosure    Progress toward goals: Progressing adequately    Interval History: Discussed the "negativity bias of the brain" and how to compensate for same through awareness, acceptance, and action.     Diagnosis: MDD    Plan: Continue treatment on BMU        "

## 2019-03-14 NOTE — PROGRESS NOTES
[facility]  Progress Note  Behavioral Medicine Unit    Date: 3/14/2019  Time: 9:40 AM    Name: Schuyler Calles Jr.    Age: 33 y.o.       : 1985            Admit Date: 3/1/19    SUBJECTIVE:  Patient is a 33 y.o. old male with a history of major depression and generalized anxiety disorder had admitted to the program after a decompensation with a suicide attempt an admission to another hospital.    Today the patient reports that his mood has been up and down but he is overall improved.  During the bed days he feels hopeless and overwhelmed by all the things in his life that he must change, and is unsure that he is able to do so.  He has made attempts to engage in activities which should improve his mood, sometimes this is successful and other times not.  Although he did not sleep well last night he is generally sleeping better.  He continues to have some nightmares.  He has not started the 2 mg presents in as yet.  He admits to occasional suicidal ideation still but denies a plan.    Well in the program he has been seeing his outside therapist Cassandra Stark weekly and this has been helpful as well.    Reviewed EKG results with the patient.      Prior trials:  prozac - took it for 2-3 yrs before it started to work less well  seroquel - helped for a while, no side effects recalled.  Retrial caused weight gain.    cymbalta - inadequate at greater than 60 mg  abilify - did not help  Buspirone - fogginess  effexor - does not recall  lexapro- became ineffective    Current Medications:   Current Outpatient Medications on File Prior to Encounter   Medication Sig Dispense Refill    buPROPion (WELLBUTRIN XL) 150 MG TB24 tablet Take 3 tablets (450 mg total) by mouth once daily. 270 tablet 1    carvedilol (COREG) 25 MG tablet Take 1 tablet (25 mg total) by mouth 2 (two) times daily. 180 tablet 3    clonazePAM (KLONOPIN) 0.5 MG tablet TAKE 1 TO 2 TABLETS BY MOUTH AT BEDTIME AS NEEDED FOR ANXIETY 90  tablet 1    EPINEPHrine (EPIPEN) 0.3 mg/0.3 mL AtIn USE UTD  0    imipramine (TOFRANIL) 10 MG Tab Take 1 tablet in the morning and 2 tablets at bedtime. 90 tablet 0    losartan (COZAAR) 50 MG tablet TAKE 1 TABLET DAILY 90 tablet 3    multivitamin (ONE DAILY MULTIVITAMIN) per tablet Take 1 tablet by mouth once daily.      prazosin (MINIPRESS) 2 MG Cap Take 1 capsule (2 mg total) by mouth every evening. 30 capsule 0    sertraline (ZOLOFT) 100 MG tablet TAKE 1 AND 1/2 TABLETS ONCEDAILY 135 tablet 1    TRUVADA 200-300 mg Tab Take 1 tablet by mouth once daily.       zolpidem (AMBIEN) 10 mg Tab Take 1 tablet (10 mg total) by mouth nightly as needed. 90 tablet 1     No current facility-administered medications on file prior to encounter.        Psychiatric ROS:  See Dr. Cohn' Admission Note of date 3/1/19 for ROS.    OBJECTIVE:  Vitals (Most Recent)   vitals were not taken for this visit.     Labs/Imaging/Studies:   No results found for this or any previous visit (from the past 48 hour(s)).   No results found for: PHENYTOIN, PHENOBARB, VALPROATE, CBMZ    Pain: 0/10    Musculoskeletal Exam:  Abnormal Involuntary Movements:  None  Gait:  Non ataxic, unassisted     Mental Status Exam:  Appearance:  Casually dressed and groomed, overweight  Behavior/Cooperation:  Cooperative, good eye contact  Speech:  Not pressured or rapid, clearly audible, no slurring  Mood:  Depressed and anxious  Affect:   full range and appropriate  Thought Process: goal-directed, logical  Thought Content:  No current SI, HI, VH, AH, delusions, or RIS  Orientation: grossly intact  Memory:  Intact for the content of the interview  Attention Span/Concentration: Attends to interview without distraction  Fund of Knowledge:  Adequate for interview  Estimate of Intelligence:  Above average from verbal skills and history  Cognition: grossly intact  Insight: patient has awareness of illness  Judgment: the patient's behavior is adequate to  circumstances      ASSESSMENT/PLAN:  General Assessment:  Patient is a 33 y.o., male with history of depression, anxiety, and cluster B traits seen in the BMU after a recent admission due to a suicide attempt.    Diagnoses:  Major Depression Disorder recurrence of severe  GOLDIE  Cluster B traits  Overweight      Plan:  Continue BMU treatment  Continue Zoloft 200 mg daily  Continue imipramine 10 mg 3 times daily.  Review of EKG reveals that increasing the dose may further increase QTC interval  Continue Wellbutrin  mg daily  Increase prazosin to 2 mg nightly as previously recommended.        Angelo Ramsay MD

## 2019-03-15 ENCOUNTER — HOSPITAL ENCOUNTER (OUTPATIENT)
Dept: PSYCHIATRY | Facility: HOSPITAL | Age: 34
Discharge: HOME | End: 2019-03-15
Attending: PSYCHIATRY & NEUROLOGY
Payer: COMMERCIAL

## 2019-03-15 DIAGNOSIS — F32.A DEPRESSION, UNSPECIFIED DEPRESSION TYPE: ICD-10-CM

## 2019-03-15 DIAGNOSIS — F33.1 MDD (MAJOR DEPRESSIVE DISORDER), RECURRENT EPISODE, MODERATE: Primary | ICD-10-CM

## 2019-03-15 PROCEDURE — 90853 GROUP PSYCHOTHERAPY: CPT

## 2019-03-15 PROCEDURE — 90853 PR GROUP PSYCHOTHERAPY: ICD-10-PCS | Mod: 59,,, | Performed by: PSYCHOLOGIST

## 2019-03-15 PROCEDURE — 90853 GROUP PSYCHOTHERAPY: CPT | Mod: 59,,, | Performed by: PSYCHOLOGIST

## 2019-03-15 NOTE — PROGRESS NOTES
03/15/19 1000   Activity/Group Therapy Checklist   Group Educational  (sleep hygiene)   Attendance Attended   Follows Direction Followed directions   Group Interactions/Observations Interacted appropriately;Alert;Supportive   Affect/Mood Range Normal range   Affect/Mood Display Appropriate   Goal Progression Progressing

## 2019-03-15 NOTE — PROGRESS NOTES
Group Psychotherapy (PhD/LCSW)    Site: [facility]    Clinical status of patient: Intensive Outpatient Program (IOP)    Date: 3/15/2019    Group Focus: Stress Management       Length of service: 92177 - 45-50 minutes    Number of patients in attendance: 11    Referred by: Behavioral Medicine Unit Treatment Team    Target symptoms: Depression    Patient's response to treatment: Active Listening and Self-disclosure    Progress toward goals: Progressing adequately    Interval History:Discussed a basic model of stress management and illustrated how to use the stress management strategies derived from the model.    Diagnosis: MDD    Plan: Continue treatment on BMU

## 2019-03-15 NOTE — PROGRESS NOTES
Group Psychotherapy (PhD/LCSW)    Site: [facility]    Clinical status of patient: Intensive Outpatient Program (IOP)    Date: 3/15/2019    Group Focus: Psychodynamic Group Psychotherapy    Length of service: 82122 - 45-50 minutes    Number of patients in attendance: 4    Referred by: Behavioral Medicine Unit Treatment Team    Target symptoms: Depression    Patient's response to treatment: Active Listening and Self-disclosure    Progress toward goals: Progressing adequately    Interval History: Pt did not complete his commitment (going to the gym) but he was able to go to the [employer], get his 's license, and rent a car. He was then able to resist the urge to drive to his ex's and redirect his energy.     Diagnosis: MDD    Plan: Continue treatment on BMU

## 2019-03-18 ENCOUNTER — HOSPITAL ENCOUNTER (OUTPATIENT)
Dept: PSYCHIATRY | Facility: HOSPITAL | Age: 34
Discharge: HOME | End: 2019-03-18
Attending: PSYCHIATRY & NEUROLOGY
Payer: COMMERCIAL

## 2019-03-18 DIAGNOSIS — F33.1 MDD (MAJOR DEPRESSIVE DISORDER), RECURRENT EPISODE, MODERATE: Primary | ICD-10-CM

## 2019-03-18 PROCEDURE — 90853 PR GROUP PSYCHOTHERAPY: ICD-10-PCS | Mod: ,,, | Performed by: PSYCHOLOGIST

## 2019-03-18 PROCEDURE — 90853 GROUP PSYCHOTHERAPY: CPT | Mod: ,,, | Performed by: PSYCHOLOGIST

## 2019-03-18 PROCEDURE — 99238 HOSP IP/OBS DSCHRG MGMT 30/<: CPT | Mod: ,,, | Performed by: PSYCHIATRY & NEUROLOGY

## 2019-03-18 PROCEDURE — 90853 GROUP PSYCHOTHERAPY: CPT | Performed by: SOCIAL WORKER

## 2019-03-18 PROCEDURE — 99238 PR HOSPITAL DISCHARGE DAY,<30 MIN: ICD-10-PCS | Mod: ,,, | Performed by: PSYCHIATRY & NEUROLOGY

## 2019-03-18 RX ORDER — IMIPRAMINE HYDROCHLORIDE 10 MG/1
TABLET, FILM COATED ORAL
Qty: 90 TABLET | Refills: 0 | Status: SHIPPED | OUTPATIENT
Start: 2019-03-18 | End: 2019-08-09

## 2019-03-18 NOTE — PROGRESS NOTES
[facility]  Discharge Note  Behavioral Medicine Unit    Date: 3/18/2019  Time: 2:20 PM    Name: Schuyler Calles Jr.    Age: 33 y.o.       : 1985            Admit Date: 3/1/19    SUBJECTIVE:  Patient is a 33 y.o. old male with a history of major depression and generalized anxiety disorder had admitted to the program after a decompensation with a suicide attempt an admission to another hospital.    Today the patient reports that he is anxious and excited.  He feels that his anxiety is normal. He has concerns that he will be able to continue with the progress that he has been making.  He reports that he had a productive weekend.  He is glad that he was able to attend the program.  He feels like he now realizes he has a lot to offer (in a relationship).  He describes renewed focus on being kind to himself and working towards being the person that he wants to be.    He denies a desire for death or to harm himself in the last 10 days.  He describes his energy as pretty good.  He reports that his sleep is improved. He is engaged in meditation.  He went to the gym 3 days in a row.  He denies crying spells.    He complains of dry mouth with imipramine.  He has started the increased dose of prazosin and is tolerating it well.    Prior trials:  prozac - took it for 2-3 yrs before it started to work less well  seroquel - helped for a while, no side effects recalled.  Retrial caused weight gain.    cymbalta - inadequate at greater than 60 mg  abilify - did not help  Buspirone - fogginess  effexor - does not recall  lexapro- became ineffective    Current Medications:   Current Outpatient Medications on File Prior to Encounter   Medication Sig Dispense Refill    buPROPion (WELLBUTRIN XL) 150 MG TB24 tablet Take 3 tablets (450 mg total) by mouth once daily. 270 tablet 1    carvedilol (COREG) 25 MG tablet Take 1 tablet (25 mg total) by mouth 2 (two) times daily. 180 tablet 3    clonazePAM  (KLONOPIN) 0.5 MG tablet TAKE 1 TO 2 TABLETS BY MOUTH AT BEDTIME AS NEEDED FOR ANXIETY 90 tablet 1    EPINEPHrine (EPIPEN) 0.3 mg/0.3 mL AtIn USE UTD  0    imipramine (TOFRANIL) 10 MG Tab Take 1 tablet in the morning and 2 tablets at bedtime. 90 tablet 0    losartan (COZAAR) 50 MG tablet TAKE 1 TABLET DAILY 90 tablet 3    multivitamin (ONE DAILY MULTIVITAMIN) per tablet Take 1 tablet by mouth once daily.      prazosin (MINIPRESS) 2 MG Cap Take 1 capsule (2 mg total) by mouth every evening. 30 capsule 0    sertraline (ZOLOFT) 100 MG tablet TAKE 1 AND 1/2 TABLETS ONCEDAILY 135 tablet 1    TRUVADA 200-300 mg Tab Take 1 tablet by mouth once daily.       zolpidem (AMBIEN) 10 mg Tab Take 1 tablet (10 mg total) by mouth nightly as needed. 90 tablet 1     No current facility-administered medications on file prior to encounter.        Psychiatric ROS:  See Dr. Cohn' Admission Note of date 3/1/19 for ROS.    OBJECTIVE:  Vitals (Most Recent)   vitals were not taken for this visit.     Labs/Imaging/Studies:   No results found for this or any previous visit (from the past 48 hour(s)).   No results found for: PHENYTOIN, PHENOBARB, VALPROATE, CBMZ    Pain: 0/10    Musculoskeletal Exam:  Abnormal Involuntary Movements:  None  Gait:  Non ataxic, unassisted     Mental Status Exam:  Appearance:  Casually dressed and groomed, overweight  Behavior/Cooperation:  Cooperative, good eye contact  Speech:  Not pressured or rapid, clearly audible, no slurring  Mood:  Depressed and anxious  Affect:   full range and appropriate  Thought Process: goal-directed, logical  Thought Content:  No current SI, HI, VH, AH, delusions, or RIS  Orientation: grossly intact  Memory:  Intact for the content of the interview  Attention Span/Concentration: Attends to interview without distraction  Fund of Knowledge:  Adequate for interview  Estimate of Intelligence:  Above average from verbal skills and history  Cognition: grossly intact  Insight:  patient has awareness of illness  Judgment: the patient's behavior is adequate to circumstances      ASSESSMENT/PLAN:  General Assessment:  Patient is a 33 y.o., male with history of depression, anxiety, and cluster B traits seen in the BMU after a recent admission due to a suicide attempt.    Diagnoses:  Major Depression Disorder recurrence of severe  GOLDIE  Cluster B traits  Overweight      Plan:  -Discharge from BMU  -Continue Zoloft 200 mg daily  -Continue imipramine 10 mg 3 times daily.  Review of EKG reveals that increasing the dose may further may not be best course.  We discussed discontinuing this medication eventually.  -Continue Wellbutrin  mg daily  -continue prazosin 2 mg nightly   -Discussed follow up with myself, Ms. Strak, and aftercare group.          Angelo Ramsay MD

## 2019-03-18 NOTE — PROGRESS NOTES
Group Psychotherapy (PhD/LCSW)    Site: [facility]    Clinical status of patient: Intensive Outpatient Program (IOP)    Date: 3/18/2019    Group Focus: Psychodynamic Group Psychotherapy    Length of service: 24481 - 45-50 minutes    Number of patients in attendance: 4    Referred by: Behavioral Medicine Unit Treatment Team    Target symptoms: Depression    Patient's response to treatment: Active Listening and Self-disclosure    Progress toward goals: Progressing adequately    Interval History: Pt reviewed his progress in treatment and discussed future plans..    Diagnosis: MDD    Plan: Complete BMU program; follow up with aftercare plan.

## 2019-03-18 NOTE — PLAN OF CARE
03/18/19 1000   Activity/Group Therapy Checklist   Group Educational  (grief/loss )   Attendance Attended   Follows Direction Followed directions   Group Interactions/Observations Interacted appropriately   Affect/Mood Range Normal range   Affect/Mood Display Appropriate   Goal Progression Progressing

## 2019-03-18 NOTE — PROGRESS NOTES
Group Psychotherapy (PhD/LCSW)    Site: [facility]    Clinical status of patient: Intensive Outpatient Program (IOP)    Date: 3/18/2019    Group Focus: Communication Skills     Length of service: 70499 - 45-50 minutes    Number of patients in attendance: 12    Referred by: Behavioral Medicine Unit Treatment Team    Target symptoms: Depression    Patient's response to treatment: Active Listening    Progress toward goals: Progressing adequately    Interval History: Discussed basic communication strategies (I-messages, Active Listening, Contextual/Approach considerations). Illustrated their use and value through modeling, role play, and vignettes.      Diagnosis: MDD    Plan: Continue treatment on BMU

## 2019-03-21 ENCOUNTER — OFFICE VISIT (OUTPATIENT)
Dept: PSYCHIATRY | Facility: CLINIC | Age: 34
End: 2019-03-21
Payer: COMMERCIAL

## 2019-03-21 DIAGNOSIS — F32.A DEPRESSION, UNSPECIFIED DEPRESSION TYPE: Primary | ICD-10-CM

## 2019-03-21 PROCEDURE — 90853 PR GROUP PSYCHOTHERAPY: ICD-10-PCS | Mod: S$GLB,,, | Performed by: PSYCHOLOGIST

## 2019-03-21 PROCEDURE — 90853 GROUP PSYCHOTHERAPY: CPT | Mod: S$GLB,,, | Performed by: PSYCHOLOGIST

## 2019-03-21 NOTE — PROGRESS NOTES
Group Psychotherapy - BMU After Care Group    Site: [facility]    Clinical status of patient: Outpatient    3/21/2019    Length of service:[id number]    Referred by: Behavioral Medicine Unit     Number of patients in attendance: 3    Target symptoms: depression    Patient's response to intervention:  The patient's response to intervention is active listening, frequent questions, feedback to other patients.    Progress toward goals and other mental status changes:  The patient's progress toward goals is good.    Interval history: Pt introduced himself to the group, and asked for feedback on how he can stay more accountable for his continued healthy practices.    Diagnosis: Depression    Plan: group psychotherapy    Return to clinic: 1 week

## 2019-03-28 ENCOUNTER — PATIENT MESSAGE (OUTPATIENT)
Dept: PSYCHIATRY | Facility: CLINIC | Age: 34
End: 2019-03-28

## 2019-04-02 ENCOUNTER — PATIENT MESSAGE (OUTPATIENT)
Dept: PSYCHIATRY | Facility: CLINIC | Age: 34
End: 2019-04-02

## 2019-04-06 NOTE — PROGRESS NOTES
"Group Psychotherapy (MD)    Site: [facility]    Clinical status of patient: Intensive Outpatient Program (IOP)    Date: 3/13/19    Group Focus: Medical and Neuropsychiatric Bases of Psychiatric Disease and Addiction: anxiety    Length of service: 38365 - 45-50 minutes    Number of patients in attendance: 13    Referred by: Behavioral Medicine Unit Treatment Team    Target symptoms: Anxiety    Patient's response to treatment: Active Listening      Interval history: Discussed psychiatric symptoms of anxiety and neurochemical basis of anxiety, relating it to addiction and other psychiatric disorders. Provided rationale for use of various anxiety treatments, introduced concept of avoidance and discussed ways to gradually expose self to sources of anxiety. Explored responses to anxiety and helped patient separte those responses into either 'self care" or "self medication".      Progress towards goals: progressing adequately    Diagnosis: Major Depressive Disorder    Plan: Continue treatment on BMU         "

## 2019-04-15 ENCOUNTER — OFFICE VISIT (OUTPATIENT)
Dept: PSYCHIATRY | Facility: CLINIC | Age: 34
End: 2019-04-15
Payer: COMMERCIAL

## 2019-04-15 VITALS
HEART RATE: 97 BPM | SYSTOLIC BLOOD PRESSURE: 124 MMHG | BODY MASS INDEX: 28.3 KG/M2 | HEIGHT: 67 IN | WEIGHT: 180.31 LBS | DIASTOLIC BLOOD PRESSURE: 85 MMHG

## 2019-04-15 DIAGNOSIS — F41.1 GAD (GENERALIZED ANXIETY DISORDER): ICD-10-CM

## 2019-04-15 DIAGNOSIS — F33.1 MDD (MAJOR DEPRESSIVE DISORDER), RECURRENT EPISODE, MODERATE: Primary | ICD-10-CM

## 2019-04-15 PROCEDURE — 99213 OFFICE O/P EST LOW 20 MIN: CPT | Mod: S$GLB,,, | Performed by: PSYCHIATRY & NEUROLOGY

## 2019-04-15 PROCEDURE — 99999 PR PBB SHADOW E&M-EST. PATIENT-LVL II: ICD-10-PCS | Mod: PBBFAC,,, | Performed by: PSYCHIATRY & NEUROLOGY

## 2019-04-15 PROCEDURE — 99999 PR PBB SHADOW E&M-EST. PATIENT-LVL II: CPT | Mod: PBBFAC,,, | Performed by: PSYCHIATRY & NEUROLOGY

## 2019-04-15 PROCEDURE — 99213 PR OFFICE/OUTPT VISIT, EST, LEVL III, 20-29 MIN: ICD-10-PCS | Mod: S$GLB,,, | Performed by: PSYCHIATRY & NEUROLOGY

## 2019-04-15 RX ORDER — PRAZOSIN HYDROCHLORIDE 2 MG/1
CAPSULE ORAL
Qty: 30 CAPSULE | Refills: 0 | OUTPATIENT
Start: 2019-04-15

## 2019-04-15 NOTE — PROGRESS NOTES
"Outpatient Psychiatry Follow-Up Visit (MD/NP)    4/15/2019    Clinical Status of Patient:  Outpatient (Ambulatory)    Chief Complaint:  Schuyler Calles Jr. is a 33 y.o. male who presents today for follow-up of depression.  Met with patient.      Interval History and Content of Current Session:   "I think I'm doing okay."  He is trying to keep himself busy, keeping his life scheduled.   He is having fewer episodes of feeling intensely sad.  He is better able to keep it under control.  Moving is helpful.   Did his first 5 K recently.  4th week back at work and so far it is good.  He is trying to redefine his relationship with work.  He has had no significant desire for death.  So far he is not doing things because he enjoys them but finds the activity be beneficial to his mood. He is losing weight though he believes he is eating enough.  He is sleeping reasonably well and has not had nightmares for a while.  He requests to be off of some of the medication.      Current medications:  Zoloft to 200 mg increased from 150 mg  Prazosin 1mg at hs  imipramimine 10 tid  Continued Wellbutrin 450 mg    Now seeing Cassandra Stark and is seeing her frequently.      Prior trials:  prozac - took it for 2-3 yrs before it started to work less well  seroquel - helped for a while, no side effects recalled.  Retrial caused weight gain.    cymbalta - inadequate at greater than 60 mg  abilify - did not help  Buspirone - fogginess  effexor - does not recall  lexapro- became ineffective      Psychotherapy:  · Target symptoms: depression  · Why chosen therapy is appropriate versus another modality: relevant to diagnosis, evidence based practice  · Outcome monitoring methods: self-report, observation  · Therapeutic intervention type: supportive psychotherapy  · Topics discussed/themes: building skills sets for symptom management, symptom recognition, legal stressors  · The patient's response to the intervention is motivated. The patient's " "progress toward treatment goals is poor.   · Duration of intervention: 8 minutes.    Review of Systems   · PSYCHIATRIC: Pertinant items are noted in the narrative.  · CONSTITUTIONAL: No weight gain or loss.     Past Medical, Family and Social History: The patient's past medical, family and social history have been reviewed and updated as appropriate within the electronic medical record - see encounter notes.    Compliance: yes    Side effects: None    Risk Parameters:  Patient reports no suicidal ideation  Patient reports no homicidal ideation  Patient reports no self-injurious behavior  Patient reports no violent behavior    Exam (detailed: at least 9 elements; comprehensive: all 15 elements)   Constitutional  Vitals:  Most recent vital signs, dated less than 90 days prior to this appointment, were reviewed.   Vitals:    04/15/19 1101   BP: 124/85   Pulse: 97   Weight: 81.8 kg (180 lb 5.4 oz)   Height: 5' 7" (1.702 m)        General:  age appropriate, casually dressed, neatly groomed     Musculoskeletal  Muscle Strength/Tone:  no dyskinesia, no tremor   Gait & Station:  non-ataxic     Psychiatric  Speech:  not pressured, rapid or loud, but clearly audible, no articulation errors, studdering or slurring   Mood:    Affect:  Near euthymic  appropriate, mood-congruent, midline   Thought Process:  goal-directed, logical   Associations:  intact   Thought Content:  normal, no suicidality, no homicidality, delusions, or paranoia   Insight:  has awareness of illness   Judgement: behavior is adequate to circumstances   Orientation:  grossly intact   Memory: intact for content of interview   Language: grossly intact   Attention Span & Concentration:  able to focus   Fund of Knowledge:  intact and appropriate to age and level of education     Assessment and Diagnosis   Status/Progress: Based on the examination today, the patient's problem(s) is/are adequately but not ideally controlled.  New problems have not been presented " today.   Co-morbidities are complicating management of the primary condition.  There are no active rule-out diagnoses for this patient at this time.     General Impression: Pt with a h/o depression and prior treatment that included some augmentation with varying success.     Diagnosis:  Major Depressive Disorder recurrent in rem  GOLDIE      Intervention/Counseling/Treatment Plan   · Patient may discontinue prazosin and restart if nightmares return  · Continue imipramine 30 mg daily.  We discussed potentially discontinuing this medication in the future as it is currently at such a low dose that it may be providing no benefits.  · Continue Zoloft 200 mg daily  · Continue Wellbutrin 450mg daily  · Continue psychotherapy with Ms. Stark          Return to Clinic:  6-8 weeks

## 2019-06-25 DIAGNOSIS — F33.40 MDD (RECURRENT MAJOR DEPRESSIVE DISORDER) IN REMISSION: ICD-10-CM

## 2019-06-25 RX ORDER — SERTRALINE HYDROCHLORIDE 100 MG/1
TABLET, FILM COATED ORAL
Qty: 135 TABLET | Refills: 0 | Status: SHIPPED | OUTPATIENT
Start: 2019-06-25 | End: 2019-08-09 | Stop reason: SDUPTHER

## 2019-06-25 RX ORDER — BUPROPION HYDROCHLORIDE 150 MG/1
TABLET ORAL
Qty: 270 TABLET | Refills: 0 | Status: SHIPPED | OUTPATIENT
Start: 2019-06-25 | End: 2019-08-09 | Stop reason: SDUPTHER

## 2019-08-09 ENCOUNTER — OFFICE VISIT (OUTPATIENT)
Dept: PSYCHIATRY | Facility: CLINIC | Age: 34
End: 2019-08-09
Payer: COMMERCIAL

## 2019-08-09 VITALS
DIASTOLIC BLOOD PRESSURE: 96 MMHG | BODY MASS INDEX: 31.63 KG/M2 | HEIGHT: 67 IN | HEART RATE: 70 BPM | WEIGHT: 201.5 LBS | SYSTOLIC BLOOD PRESSURE: 134 MMHG

## 2019-08-09 DIAGNOSIS — F33.40 MDD (RECURRENT MAJOR DEPRESSIVE DISORDER) IN REMISSION: ICD-10-CM

## 2019-08-09 DIAGNOSIS — F41.1 GAD (GENERALIZED ANXIETY DISORDER): Primary | ICD-10-CM

## 2019-08-09 PROCEDURE — 90833 PR PSYCHOTHERAPY W/PATIENT W/E&M, 30 MIN (ADD ON): ICD-10-PCS | Mod: S$GLB,,, | Performed by: PSYCHIATRY & NEUROLOGY

## 2019-08-09 PROCEDURE — 99999 PR PBB SHADOW E&M-EST. PATIENT-LVL III: ICD-10-PCS | Mod: PBBFAC,,, | Performed by: PSYCHIATRY & NEUROLOGY

## 2019-08-09 PROCEDURE — 99999 PR PBB SHADOW E&M-EST. PATIENT-LVL III: CPT | Mod: PBBFAC,,, | Performed by: PSYCHIATRY & NEUROLOGY

## 2019-08-09 PROCEDURE — 99213 PR OFFICE/OUTPT VISIT, EST, LEVL III, 20-29 MIN: ICD-10-PCS | Mod: S$GLB,,, | Performed by: PSYCHIATRY & NEUROLOGY

## 2019-08-09 PROCEDURE — 99213 OFFICE O/P EST LOW 20 MIN: CPT | Mod: S$GLB,,, | Performed by: PSYCHIATRY & NEUROLOGY

## 2019-08-09 PROCEDURE — 90833 PSYTX W PT W E/M 30 MIN: CPT | Mod: S$GLB,,, | Performed by: PSYCHIATRY & NEUROLOGY

## 2019-08-09 RX ORDER — SERTRALINE HYDROCHLORIDE 100 MG/1
200 TABLET, FILM COATED ORAL DAILY
Qty: 180 TABLET | Refills: 0 | Status: SHIPPED | OUTPATIENT
Start: 2019-08-09 | End: 2019-10-02 | Stop reason: SDUPTHER

## 2019-08-09 RX ORDER — GABAPENTIN 100 MG/1
100 CAPSULE ORAL 3 TIMES DAILY
Qty: 270 CAPSULE | Refills: 0 | Status: SHIPPED | OUTPATIENT
Start: 2019-08-09 | End: 2019-08-27 | Stop reason: SDUPTHER

## 2019-08-09 RX ORDER — BUPROPION HYDROCHLORIDE 150 MG/1
TABLET ORAL
Qty: 270 TABLET | Refills: 0 | Status: SHIPPED | OUTPATIENT
Start: 2019-08-09 | End: 2019-10-02 | Stop reason: SDUPTHER

## 2019-08-09 NOTE — PROGRESS NOTES
"Outpatient Psychiatry Follow-Up Visit (MD/NP)    8/9/2019    Clinical Status of Patient:  Outpatient (Ambulatory)    Chief Complaint:  Schuyler Calles Jr. is a 34 y.o. male who presents today for follow-up of depression.  Met with patient.      Interval History and Content of Current Session:   Dealling with a lot of depression the last two months.  Seeing his therapist twice a week.  The program to get the DUI dropped is stressful.  He has no alcohol problem but because of the program he is in he has a breathalizer in his car that he must blow into twice a day regardless of whether he drives.  Therefore he cannot put his car in the shop.  He cannot get needed surgery because he cannot take pain meds. He is realizing with therapy that he always has extreme anxiety.  The DUI program is causing anxiety and nightmares that he had a drink by accident.  He is no longer on prazosin or imipramine.  He is not allowed to take clonazepam or ambien while in the program.      He is working hard on being less self critical about being depressed.   Denies SI but admits that there are days he wants to die, perhaps a few times a month.  Eating "too okay" and uses this for coping.  Gaining weight.  Not exercising.  Going to work but has missed 2 days in the last month.  He is performing adequately at work but getting some negative comments from his supervisor who knows about his mental health condition.  He is trying to get a transfer.       Seeing Cassandra Stark twice weekly currently.      Current medications:  Zoloft to 200 mg increased from 150 mg  Wellbutrin 450 mg        Prior trials:  prozac - took it for 2-3 yrs before it started to work less well  seroquel - helped for a while, no side effects recalled.  Retrial caused weight gain.    cymbalta - inadequate at greater than 60 mg  abilify - did not help  Buspirone - fogginess  effexor - does not recall  lexapro- became ineffective      Psychotherapy:  · Target symptoms: " "depression  · Why chosen therapy is appropriate versus another modality: relevant to diagnosis, evidence based practice  · Outcome monitoring methods: self-report, observation  · Therapeutic intervention type: supportive psychotherapy  · Topics discussed/themes: work stress, building skills sets for symptom management, symptom recognition, legal stressors  · The patient's response to the intervention is motivated. The patient's progress toward treatment goals is poor.   · Duration of intervention: 16 minutes.    Review of Systems   · PSYCHIATRIC: Pertinant items are noted in the narrative.  · CONSTITUTIONAL: No weight gain or loss.     Past Medical, Family and Social History: The patient's past medical, family and social history have been reviewed and updated as appropriate within the electronic medical record - see encounter notes.    Compliance: yes    Side effects: None    Risk Parameters:  Patient reports no suicidal ideation  Patient reports no homicidal ideation  Patient reports no self-injurious behavior  Patient reports no violent behavior    Exam (detailed: at least 9 elements; comprehensive: all 15 elements)   Constitutional  Vitals:  Most recent vital signs, dated less than 90 days prior to this appointment, were reviewed.   Vitals:    08/09/19 0752   BP: (!) 134/96   Pulse: 70   Weight: 91.4 kg (201 lb 8 oz)   Height: 5' 7" (1.702 m)        General:  age appropriate, casually dressed, neatly groomed     Musculoskeletal  Muscle Strength/Tone:  no dyskinesia, no tremor   Gait & Station:  non-ataxic     Psychiatric  Speech:  not pressured, rapid or loud, but clearly audible, no articulation errors, studdering or slurring   Mood:    Affect:  Near euthymic  appropriate, mood-congruent, midline   Thought Process:  goal-directed, logical   Associations:  intact   Thought Content:  normal, no suicidality, no homicidality, delusions, or paranoia   Insight:  has awareness of illness   Judgement: behavior is " adequate to circumstances   Orientation:  grossly intact   Memory: intact for content of interview   Language: grossly intact   Attention Span & Concentration:  able to focus   Fund of Knowledge:  intact and appropriate to age and level of education     Assessment and Diagnosis   Status/Progress: Based on the examination today, the patient's problem(s) is/are adequately but not ideally controlled.  New problems have not been presented today.   Co-morbidities are complicating management of the primary condition.  There are no active rule-out diagnoses for this patient at this time.     General Impression: Pt with a h/o depression and prior treatment that included some augmentation with varying success.     Diagnosis:  Major Depressive Disorder recurrent in rem  GOLDIE      Intervention/Counseling/Treatment Plan   · Trial of gabapentin 100 mg 3 times daily for anxiety.  · Continue Zoloft 200 mg daily  · Continue Wellbutrin 450mg daily  · Continue psychotherapy with Ms. Stark        Return to Clinic:  6-8 weeks

## 2019-08-16 ENCOUNTER — PATIENT MESSAGE (OUTPATIENT)
Dept: PSYCHIATRY | Facility: CLINIC | Age: 34
End: 2019-08-16

## 2019-08-25 ENCOUNTER — PATIENT MESSAGE (OUTPATIENT)
Dept: PSYCHIATRY | Facility: CLINIC | Age: 34
End: 2019-08-25

## 2019-08-27 RX ORDER — GABAPENTIN 100 MG/1
200 CAPSULE ORAL 3 TIMES DAILY
Qty: 180 CAPSULE | Refills: 0 | Status: SHIPPED | OUTPATIENT
Start: 2019-08-27 | End: 2019-08-29 | Stop reason: SDUPTHER

## 2019-08-29 RX ORDER — GABAPENTIN 100 MG/1
200 CAPSULE ORAL 3 TIMES DAILY
Qty: 180 CAPSULE | Refills: 0 | Status: SHIPPED | OUTPATIENT
Start: 2019-08-29 | End: 2019-09-30 | Stop reason: SDUPTHER

## 2019-09-23 RX ORDER — SERTRALINE HYDROCHLORIDE 100 MG/1
TABLET, FILM COATED ORAL
Qty: 135 TABLET | Refills: 0 | OUTPATIENT
Start: 2019-09-23

## 2019-09-29 ENCOUNTER — PATIENT MESSAGE (OUTPATIENT)
Dept: PSYCHIATRY | Facility: CLINIC | Age: 34
End: 2019-09-29

## 2019-09-30 RX ORDER — GABAPENTIN 100 MG/1
200 CAPSULE ORAL 3 TIMES DAILY
Qty: 180 CAPSULE | Refills: 0 | Status: SHIPPED | OUTPATIENT
Start: 2019-09-30 | End: 2019-10-02 | Stop reason: SDUPTHER

## 2019-10-02 ENCOUNTER — OFFICE VISIT (OUTPATIENT)
Dept: PSYCHIATRY | Facility: CLINIC | Age: 34
End: 2019-10-02
Payer: COMMERCIAL

## 2019-10-02 VITALS
HEART RATE: 103 BPM | DIASTOLIC BLOOD PRESSURE: 70 MMHG | SYSTOLIC BLOOD PRESSURE: 120 MMHG | BODY MASS INDEX: 31.23 KG/M2 | WEIGHT: 199.44 LBS

## 2019-10-02 DIAGNOSIS — F33.40 MDD (RECURRENT MAJOR DEPRESSIVE DISORDER) IN REMISSION: ICD-10-CM

## 2019-10-02 DIAGNOSIS — F41.1 GAD (GENERALIZED ANXIETY DISORDER): Primary | ICD-10-CM

## 2019-10-02 PROCEDURE — 90833 PR PSYCHOTHERAPY W/PATIENT W/E&M, 30 MIN (ADD ON): ICD-10-PCS | Mod: S$GLB,,, | Performed by: PSYCHIATRY & NEUROLOGY

## 2019-10-02 PROCEDURE — 99213 PR OFFICE/OUTPT VISIT, EST, LEVL III, 20-29 MIN: ICD-10-PCS | Mod: S$GLB,,, | Performed by: PSYCHIATRY & NEUROLOGY

## 2019-10-02 PROCEDURE — 99213 OFFICE O/P EST LOW 20 MIN: CPT | Mod: S$GLB,,, | Performed by: PSYCHIATRY & NEUROLOGY

## 2019-10-02 PROCEDURE — 99999 PR PBB SHADOW E&M-EST. PATIENT-LVL II: ICD-10-PCS | Mod: PBBFAC,,, | Performed by: PSYCHIATRY & NEUROLOGY

## 2019-10-02 PROCEDURE — 99999 PR PBB SHADOW E&M-EST. PATIENT-LVL II: CPT | Mod: PBBFAC,,, | Performed by: PSYCHIATRY & NEUROLOGY

## 2019-10-02 PROCEDURE — 90833 PSYTX W PT W E/M 30 MIN: CPT | Mod: S$GLB,,, | Performed by: PSYCHIATRY & NEUROLOGY

## 2019-10-02 RX ORDER — GABAPENTIN 300 MG/1
300 CAPSULE ORAL 2 TIMES DAILY
Qty: 180 CAPSULE | Refills: 0 | Status: ON HOLD | OUTPATIENT
Start: 2019-10-02 | End: 2020-01-11 | Stop reason: HOSPADM

## 2019-10-02 RX ORDER — SERTRALINE HYDROCHLORIDE 100 MG/1
200 TABLET, FILM COATED ORAL DAILY
Qty: 180 TABLET | Refills: 0 | Status: SHIPPED | OUTPATIENT
Start: 2019-10-02 | End: 2020-02-03

## 2019-10-02 RX ORDER — BUPROPION HYDROCHLORIDE 150 MG/1
TABLET ORAL
Qty: 270 TABLET | Refills: 0 | Status: SHIPPED | OUTPATIENT
Start: 2019-10-02 | End: 2020-02-18 | Stop reason: SDUPTHER

## 2019-10-02 NOTE — PROGRESS NOTES
"Outpatient Psychiatry Follow-Up Visit (MD/NP)    10/2/2019    Clinical Status of Patient:  Outpatient (Ambulatory)    Chief Complaint:  Schuyler Calles Jr. is a 34 y.o. male who presents today for follow-up of depression.  Met with patient.      Interval History and Content of Current Session:   "I think I'm doing okay."  Nearing finish of DUI program is helping.  No longer has breathalzyer in car.  Gave last hair sample.  So he is not sure if it is all of this or the medication.  He has been mr\lonnie ingaged in things he enjoys.  He has not gotten back to the gym.  Sleeping less because of allergy flairup.  Has sinus surgery upcoming.  Work is intense, but is learning better with the help of his therapist to deal with it.  Has started in a different department for 3 wks.  Energy is good.  Denies desiring death.      Had to put his dog of 15 yrs to sleep, and missed a day of work for this only.      Not sedated by gabapentin which was increased to 200 mg tid over the phone after last appt.      Seeing Cassandra Stark twice weekly currently.      Current medications:  Zoloft to 200 mg  Wellbutrin 450 mg  Gabapentin 200 mg tid      Prior trials:  prozac - took it for 2-3 yrs before it started to work less well  seroquel - helped for a while, no side effects recalled.  Retrial caused weight gain.    cymbalta - inadequate at greater than 60 mg  abilify - did not help  Buspirone - fogginess  effexor - does not recall  lexapro- became ineffective      Psychotherapy:  · Target symptoms: depression  · Why chosen therapy is appropriate versus another modality: relevant to diagnosis, evidence based practice  · Outcome monitoring methods: self-report, observation  · Therapeutic intervention type: supportive psychotherapy  · Topics discussed/themes: work stress, building skills sets for symptom management, symptom recognition, legal stressors  · The patient's response to the intervention is motivated. The patient's progress toward " treatment goals is poor.   · Duration of intervention: 16 minutes.    Review of Systems   · PSYCHIATRIC: Pertinant items are noted in the narrative.  · CONSTITUTIONAL: No weight gain or loss.   · RESPIRATORY: No shortness of breath.  · CARDIOVASCULAR: No tachycardia or chest pain.  · GASTROINTESTINAL: No nausea, vomiting, pain, constipation or diarrhea.    Past Medical, Family and Social History: The patient's past medical, family and social history have been reviewed and updated as appropriate within the electronic medical record - see encounter notes.    Compliance: yes    Side effects: None    Risk Parameters:  Patient reports no suicidal ideation  Patient reports no homicidal ideation  Patient reports no self-injurious behavior  Patient reports no violent behavior    Exam (detailed: at least 9 elements; comprehensive: all 15 elements)   Constitutional  Vitals:  Most recent vital signs, dated less than 90 days prior to this appointment, were reviewed.   Vitals:    10/02/19 0907   BP: 120/70   Pulse: 103   Weight: 90.5 kg (199 lb 6.5 oz)        General:  age appropriate, casually dressed, neatly groomed     Musculoskeletal  Muscle Strength/Tone:  no dyskinesia, no tremor   Gait & Station:  non-ataxic     Psychiatric  Speech:  not pressured, rapid or loud, but clearly audible, no articulation errors, studdering or slurring   Mood:    Affect:  euthymic  appropriate, mood-congruent, midline   Thought Process:  goal-directed, logical   Associations:  intact   Thought Content:  normal, no suicidality, no homicidality, delusions, or paranoia   Insight:  has awareness of illness   Judgement: behavior is adequate to circumstances   Orientation:  grossly intact   Memory: intact for content of interview   Language: grossly intact   Attention Span & Concentration:  able to focus   Fund of Knowledge:  intact and appropriate to age and level of education     Assessment and Diagnosis   Status/Progress: Based on the examination  today, the patient's problem(s) is/are adequately but not ideally controlled.  New problems have not been presented today.   Co-morbidities are complicating management of the primary condition.  There are no active rule-out diagnoses for this patient at this time.     General Impression: Pt with a h/o depression and prior treatment that included some augmentation with varying success.     Diagnosis:  Major Depressive Disorder recurrent in rem  GOLDIE      Intervention/Counseling/Treatment Plan   · Change gabapentin to 300 mg bid  · Continue Zoloft 200 mg daily  · Continue Wellbutrin 450mg daily  · Continue psychotherapy with Ms. Stark        Return to Clinic:  3 months

## 2019-10-04 ENCOUNTER — PATIENT MESSAGE (OUTPATIENT)
Dept: PSYCHIATRY | Facility: CLINIC | Age: 34
End: 2019-10-04

## 2019-12-22 DIAGNOSIS — I10 ESSENTIAL HYPERTENSION: ICD-10-CM

## 2019-12-23 RX ORDER — LOSARTAN POTASSIUM 50 MG/1
TABLET ORAL
Qty: 90 TABLET | Refills: 3 | Status: SHIPPED | OUTPATIENT
Start: 2019-12-23 | End: 2020-11-24

## 2019-12-23 RX ORDER — CARVEDILOL 25 MG/1
TABLET ORAL
Qty: 180 TABLET | Refills: 3 | Status: SHIPPED | OUTPATIENT
Start: 2019-12-23 | End: 2020-11-24

## 2020-01-05 ENCOUNTER — HOSPITAL ENCOUNTER (EMERGENCY)
Facility: HOSPITAL | Age: 35
Discharge: PSYCHIATRIC OR SUBSTANCE USE FACILITY | End: 2020-01-06
Attending: EMERGENCY MEDICINE
Payer: COMMERCIAL

## 2020-01-05 DIAGNOSIS — T50.901A OVERDOSE: ICD-10-CM

## 2020-01-05 LAB
ALBUMIN SERPL BCP-MCNC: 4.5 G/DL (ref 3.5–5.2)
ALP SERPL-CCNC: 115 U/L (ref 55–135)
ALT SERPL W/O P-5'-P-CCNC: 32 U/L (ref 10–44)
AMPHET+METHAMPHET UR QL: NEGATIVE
ANION GAP SERPL CALC-SCNC: 13 MMOL/L (ref 8–16)
APAP SERPL-MCNC: <3 UG/ML (ref 10–20)
AST SERPL-CCNC: 22 U/L (ref 10–40)
BARBITURATES UR QL SCN>200 NG/ML: NEGATIVE
BASOPHILS # BLD AUTO: 0.01 K/UL (ref 0–0.2)
BASOPHILS NFR BLD: 0.2 % (ref 0–1.9)
BENZODIAZ UR QL SCN>200 NG/ML: NORMAL
BILIRUB SERPL-MCNC: 0.8 MG/DL (ref 0.1–1)
BILIRUB UR QL STRIP: NEGATIVE
BUN SERPL-MCNC: 18 MG/DL (ref 6–20)
BZE UR QL SCN: NEGATIVE
CALCIUM SERPL-MCNC: 9.7 MG/DL (ref 8.7–10.5)
CANNABINOIDS UR QL SCN: NEGATIVE
CHLORIDE SERPL-SCNC: 108 MMOL/L (ref 95–110)
CLARITY UR: CLEAR
CO2 SERPL-SCNC: 21 MMOL/L (ref 23–29)
COLOR UR: YELLOW
CREAT SERPL-MCNC: 1 MG/DL (ref 0.5–1.4)
CREAT UR-MCNC: 164.9 MG/DL (ref 23–375)
DIFFERENTIAL METHOD: ABNORMAL
EOSINOPHIL # BLD AUTO: 0.2 K/UL (ref 0–0.5)
EOSINOPHIL NFR BLD: 2.9 % (ref 0–8)
ERYTHROCYTE [DISTWIDTH] IN BLOOD BY AUTOMATED COUNT: 12.6 % (ref 11.5–14.5)
EST. GFR  (AFRICAN AMERICAN): >60 ML/MIN/1.73 M^2
EST. GFR  (NON AFRICAN AMERICAN): >60 ML/MIN/1.73 M^2
ETHANOL SERPL-MCNC: <10 MG/DL
GLUCOSE SERPL-MCNC: 104 MG/DL (ref 70–110)
GLUCOSE UR QL STRIP: NEGATIVE
HCT VFR BLD AUTO: 42 % (ref 40–54)
HGB BLD-MCNC: 14.7 G/DL (ref 14–18)
HGB UR QL STRIP: NEGATIVE
KETONES UR QL STRIP: NEGATIVE
LEUKOCYTE ESTERASE UR QL STRIP: NEGATIVE
LYMPHOCYTES # BLD AUTO: 1.9 K/UL (ref 1–4.8)
LYMPHOCYTES NFR BLD: 33 % (ref 18–48)
MCH RBC QN AUTO: 31.7 PG (ref 27–31)
MCHC RBC AUTO-ENTMCNC: 35 G/DL (ref 32–36)
MCV RBC AUTO: 91 FL (ref 82–98)
METHADONE UR QL SCN>300 NG/ML: NEGATIVE
MONOCYTES # BLD AUTO: 0.5 K/UL (ref 0.3–1)
MONOCYTES NFR BLD: 8.4 % (ref 4–15)
NEUTROPHILS # BLD AUTO: 3.2 K/UL (ref 1.8–7.7)
NEUTROPHILS NFR BLD: 55.5 % (ref 38–73)
NITRITE UR QL STRIP: NEGATIVE
OPIATES UR QL SCN: NEGATIVE
PCP UR QL SCN>25 NG/ML: NEGATIVE
PH UR STRIP: 6 [PH] (ref 5–8)
PLATELET # BLD AUTO: 212 K/UL (ref 150–350)
PMV BLD AUTO: 10.2 FL (ref 9.2–12.9)
POTASSIUM SERPL-SCNC: 3.8 MMOL/L (ref 3.5–5.1)
PROT SERPL-MCNC: 7.8 G/DL (ref 6–8.4)
PROT UR QL STRIP: NEGATIVE
RBC # BLD AUTO: 4.64 M/UL (ref 4.6–6.2)
SODIUM SERPL-SCNC: 142 MMOL/L (ref 136–145)
SP GR UR STRIP: >=1.03 (ref 1–1.03)
TOXICOLOGY INFORMATION: NORMAL
TSH SERPL DL<=0.005 MIU/L-ACNC: 1.17 UIU/ML (ref 0.4–4)
URN SPEC COLLECT METH UR: ABNORMAL
UROBILINOGEN UR STRIP-ACNC: NEGATIVE EU/DL
WBC # BLD AUTO: 5.81 K/UL (ref 3.9–12.7)

## 2020-01-05 PROCEDURE — 99285 EMERGENCY DEPT VISIT HI MDM: CPT | Mod: 25

## 2020-01-05 PROCEDURE — 81003 URINALYSIS AUTO W/O SCOPE: CPT | Mod: 59

## 2020-01-05 PROCEDURE — 84443 ASSAY THYROID STIM HORMONE: CPT

## 2020-01-05 PROCEDURE — 80053 COMPREHEN METABOLIC PANEL: CPT

## 2020-01-05 PROCEDURE — 99282 EMERGENCY DEPT VISIT SF MDM: CPT | Mod: GT,,, | Performed by: PSYCHIATRY & NEUROLOGY

## 2020-01-05 PROCEDURE — 93005 ELECTROCARDIOGRAM TRACING: CPT

## 2020-01-05 PROCEDURE — 99282 PR EMERGENCY DEPT VISIT,LEVEL II: ICD-10-PCS | Mod: GT,,, | Performed by: PSYCHIATRY & NEUROLOGY

## 2020-01-05 PROCEDURE — 85025 COMPLETE CBC W/AUTO DIFF WBC: CPT

## 2020-01-05 PROCEDURE — 80307 DRUG TEST PRSMV CHEM ANLYZR: CPT

## 2020-01-05 PROCEDURE — 80320 DRUG SCREEN QUANTALCOHOLS: CPT

## 2020-01-05 PROCEDURE — 80329 ANALGESICS NON-OPIOID 1 OR 2: CPT

## 2020-01-06 ENCOUNTER — HOSPITAL ENCOUNTER (INPATIENT)
Facility: HOSPITAL | Age: 35
LOS: 6 days | Discharge: HOME | DRG: 885 | End: 2020-01-12
Attending: PSYCHIATRY & NEUROLOGY | Admitting: PSYCHIATRY & NEUROLOGY
Payer: COMMERCIAL

## 2020-01-06 VITALS
RESPIRATION RATE: 18 BRPM | HEIGHT: 67 IN | HEART RATE: 99 BPM | BODY MASS INDEX: 32.49 KG/M2 | OXYGEN SATURATION: 96 % | SYSTOLIC BLOOD PRESSURE: 117 MMHG | DIASTOLIC BLOOD PRESSURE: 82 MMHG | TEMPERATURE: 98 F | WEIGHT: 207 LBS

## 2020-01-06 DIAGNOSIS — F32.A DEPRESSION: ICD-10-CM

## 2020-01-06 DIAGNOSIS — F33.1 MDD (MAJOR DEPRESSIVE DISORDER), RECURRENT EPISODE, MODERATE: Primary | ICD-10-CM

## 2020-01-06 DIAGNOSIS — R00.2 PALPITATION: ICD-10-CM

## 2020-01-06 DIAGNOSIS — F41.1 GAD (GENERALIZED ANXIETY DISORDER): ICD-10-CM

## 2020-01-06 DIAGNOSIS — F32.A DEPRESSION, UNSPECIFIED DEPRESSION TYPE: ICD-10-CM

## 2020-01-06 DIAGNOSIS — F33.2 MDD (MAJOR DEPRESSIVE DISORDER), RECURRENT SEVERE, WITHOUT PSYCHOSIS: ICD-10-CM

## 2020-01-06 DIAGNOSIS — I10 ESSENTIAL HYPERTENSION: ICD-10-CM

## 2020-01-06 PROCEDURE — 90833 PR PSYCHOTHERAPY W/PATIENT W/E&M, 30 MIN (ADD ON): ICD-10-PCS | Mod: ,,, | Performed by: PSYCHIATRY & NEUROLOGY

## 2020-01-06 PROCEDURE — 25000003 PHARM REV CODE 250: Performed by: PSYCHIATRY & NEUROLOGY

## 2020-01-06 PROCEDURE — 99252 PR INITIAL INPATIENT CONSULT,LEVL II: ICD-10-PCS | Mod: ,,, | Performed by: NURSE PRACTITIONER

## 2020-01-06 PROCEDURE — 90833 PSYTX W PT W E/M 30 MIN: CPT | Mod: ,,, | Performed by: PSYCHIATRY & NEUROLOGY

## 2020-01-06 PROCEDURE — 99223 1ST HOSP IP/OBS HIGH 75: CPT | Mod: ,,, | Performed by: PSYCHIATRY & NEUROLOGY

## 2020-01-06 PROCEDURE — 99223 PR INITIAL HOSPITAL CARE,LEVL III: ICD-10-PCS | Mod: ,,, | Performed by: PSYCHIATRY & NEUROLOGY

## 2020-01-06 PROCEDURE — 99252 IP/OBS CONSLTJ NEW/EST SF 35: CPT | Mod: ,,, | Performed by: NURSE PRACTITIONER

## 2020-01-06 PROCEDURE — 11400000 HC PSYCH PRIVATE ROOM

## 2020-01-06 RX ORDER — LOPERAMIDE HYDROCHLORIDE 2 MG/1
2 CAPSULE ORAL
Status: DISCONTINUED | OUTPATIENT
Start: 2020-01-06 | End: 2020-01-12 | Stop reason: HOSPADM

## 2020-01-06 RX ORDER — HYDROXYZINE PAMOATE 50 MG/1
50 CAPSULE ORAL EVERY 6 HOURS PRN
Status: DISCONTINUED | OUTPATIENT
Start: 2020-01-06 | End: 2020-01-06

## 2020-01-06 RX ORDER — DOCUSATE SODIUM 100 MG/1
100 CAPSULE, LIQUID FILLED ORAL DAILY PRN
Status: DISCONTINUED | OUTPATIENT
Start: 2020-01-06 | End: 2020-01-12 | Stop reason: HOSPADM

## 2020-01-06 RX ORDER — OLANZAPINE 10 MG/1
10 TABLET ORAL EVERY 4 HOURS PRN
Status: DISCONTINUED | OUTPATIENT
Start: 2020-01-06 | End: 2020-01-12 | Stop reason: HOSPADM

## 2020-01-06 RX ORDER — OLANZAPINE 10 MG/2ML
10 INJECTION, POWDER, FOR SOLUTION INTRAMUSCULAR EVERY 4 HOURS PRN
Status: DISCONTINUED | OUTPATIENT
Start: 2020-01-06 | End: 2020-01-12 | Stop reason: HOSPADM

## 2020-01-06 RX ORDER — GABAPENTIN 300 MG/1
300 CAPSULE ORAL 2 TIMES DAILY
Status: DISCONTINUED | OUTPATIENT
Start: 2020-01-06 | End: 2020-01-07

## 2020-01-06 RX ORDER — CARVEDILOL 12.5 MG/1
25 TABLET ORAL 2 TIMES DAILY
Status: DISCONTINUED | OUTPATIENT
Start: 2020-01-06 | End: 2020-01-12 | Stop reason: HOSPADM

## 2020-01-06 RX ORDER — MAG HYDROX/ALUMINUM HYD/SIMETH 200-200-20
30 SUSPENSION, ORAL (FINAL DOSE FORM) ORAL EVERY 6 HOURS PRN
Status: DISCONTINUED | OUTPATIENT
Start: 2020-01-06 | End: 2020-01-12 | Stop reason: HOSPADM

## 2020-01-06 RX ORDER — LOSARTAN POTASSIUM 50 MG/1
50 TABLET ORAL DAILY
Status: DISCONTINUED | OUTPATIENT
Start: 2020-01-06 | End: 2020-01-12 | Stop reason: HOSPADM

## 2020-01-06 RX ORDER — ACETAMINOPHEN 325 MG/1
650 TABLET ORAL EVERY 6 HOURS PRN
Status: DISCONTINUED | OUTPATIENT
Start: 2020-01-06 | End: 2020-01-06

## 2020-01-06 RX ORDER — OLANZAPINE 10 MG/1
10 TABLET ORAL EVERY 6 HOURS PRN
Status: DISCONTINUED | OUTPATIENT
Start: 2020-01-06 | End: 2020-01-06

## 2020-01-06 RX ORDER — HYDROXYZINE PAMOATE 50 MG/1
50 CAPSULE ORAL EVERY 6 HOURS PRN
Status: DISCONTINUED | OUTPATIENT
Start: 2020-01-06 | End: 2020-01-12 | Stop reason: HOSPADM

## 2020-01-06 RX ORDER — ACETAMINOPHEN 325 MG/1
650 TABLET ORAL EVERY 6 HOURS PRN
Status: DISCONTINUED | OUTPATIENT
Start: 2020-01-06 | End: 2020-01-12 | Stop reason: HOSPADM

## 2020-01-06 RX ORDER — OLANZAPINE 10 MG/2ML
10 INJECTION, POWDER, FOR SOLUTION INTRAMUSCULAR EVERY 6 HOURS PRN
Status: DISCONTINUED | OUTPATIENT
Start: 2020-01-06 | End: 2020-01-06

## 2020-01-06 RX ORDER — SERTRALINE HYDROCHLORIDE 100 MG/1
200 TABLET, FILM COATED ORAL DAILY
Status: DISCONTINUED | OUTPATIENT
Start: 2020-01-06 | End: 2020-01-12 | Stop reason: HOSPADM

## 2020-01-06 RX ORDER — FOLIC ACID 1 MG/1
1 TABLET ORAL DAILY
Status: DISCONTINUED | OUTPATIENT
Start: 2020-01-06 | End: 2020-01-12 | Stop reason: HOSPADM

## 2020-01-06 RX ADMIN — GABAPENTIN 300 MG: 300 CAPSULE ORAL at 08:01

## 2020-01-06 RX ADMIN — CARVEDILOL 25 MG: 12.5 TABLET, FILM COATED ORAL at 12:01

## 2020-01-06 RX ADMIN — FOLIC ACID 1 MG: 1 TABLET ORAL at 12:01

## 2020-01-06 RX ADMIN — THERA TABS 1 TABLET: TAB at 12:01

## 2020-01-06 RX ADMIN — LOSARTAN POTASSIUM 50 MG: 50 TABLET, FILM COATED ORAL at 12:01

## 2020-01-06 RX ADMIN — SERTRALINE HYDROCHLORIDE 200 MG: 100 TABLET ORAL at 12:01

## 2020-01-06 RX ADMIN — BUPROPION HYDROCHLORIDE 450 MG: 150 TABLET, EXTENDED RELEASE ORAL at 12:01

## 2020-01-06 RX ADMIN — GABAPENTIN 300 MG: 300 CAPSULE ORAL at 12:01

## 2020-01-06 RX ADMIN — CARVEDILOL 25 MG: 12.5 TABLET, FILM COATED ORAL at 08:01

## 2020-01-06 NOTE — ED NOTES
Per Dr. Wisdom patient will need to be observed for 4 hours from arriving to ER before being medically cleared

## 2020-01-06 NOTE — ED NOTES
Nurse confirmed with security that patient was wanded down when first arriving to ER. Deandra, friend, did leave bedside. Took all his belongings with her. She is not on patients contact list. Nurse did confirm with patient that he would not like any friends to know any medical information. Deandra aware.

## 2020-01-06 NOTE — ED NOTES
Report given to Heather at [facility]. Let her know [facility] stated they would be arriving in 30 minutes.

## 2020-01-06 NOTE — ED NOTES
Patient accepted at [facility]. Dr. Browne is accepting MD. Number to call report is [phone]

## 2020-01-06 NOTE — PLAN OF CARE
Problem: Adult Behavioral Health Plan of Care  Goal: Optimized Coping Skills in Response to Life Stressors  Intervention: Promote Effective Coping Strategies  1/6/2020 1530 by Cayla Marrufo LMSW  Flowsheets (Taken 1/6/2020 1530)  Supportive Measures: active listening utilized;counseling provided;positive reinforcement provided;verbalization of feelings encouraged;problem solving facilitated;relaxation techniques promoted;self-care encouraged;self-reflection promoted;self-responsibility promoted     Behavior: Patient was isolated in room,  asleep.       Intervention: Patient was engaged individually because he did not attend psychotherapeutic group session.  will encourage patient to express perceived barriers to engaging in treatment, concerns, and goals.       Response: Patient did not wish to talk individually with  at this time.      Plan:  will continue to encourage patient to engage and take personal responsibility for treatment, explore and verbalize emotions, set goals to aid in preventing re-hospitalization, attend psychotherapeutic group, and follow up with aftercare appointments.

## 2020-01-06 NOTE — ED TRIAGE NOTES
Patient brought in by EMS. Friend at bedside. Friend stated his  tried to get in contact with him for a session today when she couldn't so she called friend suggesting a well check. Patient awake and alert but with slurred speech. States he took no more than 16 of his prescribed Klonopin. Patient reports having a breakdown today due to stress of work (he is an ), a bad breakup, and a death in the family. He was committed to [facility] last Feb for same reasons. He states he is not suicidal. Has been seeing a  a couple times of week with progress. Today he just became overwhelmed. He admits to not taking his other prescribed medication in a couple of weeks. Patient denies pain. Denies N/V or abdominal pain. Patient is very cooperative    - Cassandra Stark [phone] (permission given by pt to speak with  if necessary)    Review of patient's allergies indicates:  No Known Allergies     Patient has verified the spelling of their name and  on armband.   APPEARANCE: Patient is alert, calm, oriented x 4, and does not appear distressed. +drowsy  SKIN: Skin is normal for race, warm, and dry. Normal skin turgor and mucous membranes moist.  CARDIAC: Normal rate and rhythm, no murmur heard. Denies chest pain  RESPIRATORY:Normal rate and effort. Breath sounds clear bilaterally throughout chest. Respirations are equal and unlabored.    GASTRO: Bowel sounds normal, abdomen is soft, no tenderness, and no abdominal distention.  MUSCLE: Full ROM. No bony tenderness or soft tissue tenderness. No obvious deformity.  PERIPHERAL VASCULAR: peripheral pulses present. Normal cap refill. No edema. Warm to touch.  NEURO: 5/5 strength major flexors/extensors bilaterally. Sensory intact to light touch bilaterally. Av coma scale: eyes open spontaneously-4, oriented & converses-5, obeys commands-6. No neurological abnormalities.   MENTAL STATUS: awake, alert and aware of environment.  +slurred speech but coherent   EYE: No overt deficits noted. No drainage. Sclera WNL  ENT: EARS: no obvious drainage. NOSE: no active bleeding. THROAT: no redness or swelling.

## 2020-01-06 NOTE — PROGRESS NOTES
01/06/20 1530   [facility] Group Therapy   Group Name Leisure Skills Training   Specific Interventions Coping Skills Training   Participation Level None   Participation Quality Sleeping   Staff will continue to monitor the patient an document progress.

## 2020-01-06 NOTE — PLAN OF CARE
Recommendation:   1. Consider adding Boost Plus with meals to diet order support nutritional intake;  2. Regular Diet is appropriate a this time      - Consider changing diet to low sodium if BP is continuously elevated;  3. encourage adequate intake of meals      Goals: pt to increase meal intake to at least 50% by f/u  Nutrition Goal Status: new
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
## 2020-01-06 NOTE — ED NOTES
Patient is mouth breathing. O2 sat dropping to low 90s- high 80s at times. Placed pt on 2 L NC while sleeping.

## 2020-01-06 NOTE — NURSING
"ADMIT NOTE:  33 yo WM arrived to 2nd floor in a wheelchair accompanied by Security and SPD attendants x 2.  Was able to stand and be wanded, no metal was detected.  Ambulated onto unit.  V.S,ht and wt taken. Pulse was elevated.  Pt was very anxious.  Pt states he sees a therapist 2 x a week, Cassandra Stark LCSW and has a psychiatrist, Dr. YANY Ramsay at [facility].  Pt would like us to contact both to obtain hx because he doesn't want to have to repeat his history.  Currently expressing debilitating anxiety and took klonopin that didn't help and then an old prescription of seroquel that really made him groggy and when he didn't answer his phone the therapist became concerned.  He states he did not attempt suicide. States "I just want to separate from my brain for a time, not forever but just a time". Denies alcohol and illicit drug abuse or cigarette smoking.  But admits that in  did attempt to overdose on klonopin after the breakup with his boyfriend.  Currently his great aunt Karley  on 19 and to have to go to that  was horrible.  Pt states he has no family left.  His best friend's mom Ofelia is like a mom to him but he got angry with her in the ER and he told her to take all his belongings with her and now he has noone's phone #.   Oriented to unit and protocols, voiced understanding but states in his frame of mind may not remember much.  Showed pt to his room and he went to bed and to sleep.Will continue to monitor.  Safety and precautions maintained, bed low and pathway kept clear.  "

## 2020-01-06 NOTE — ED PROVIDER NOTES
"Encounter Date: 1/5/2020    SCRIBE #1 NOTE: I, Michelle Ahumada, am scribing for, and in the presence of,  Dr. Wisdom. I have scribed the entire note.       History     Chief Complaint   Patient presents with    Ingestion     per EMS, patient took 5 klonopin today at unknown time to try and "sleep through breakup".  Mother called EMS because patient's therapist called Mom after patient had missed an appt.     Pt is a 34 y.o. male w/h/o depression, anxiety, and suicide attempt by overdose on klonopin in February, who presents to the ED via EMS after taking possibly multiple doses of Klonopin earlier today. Patient believes he did not take more than 16 doses of 0.5 MG of Klonopin today. Friend reports patient has been going through a lot of stress lately due to recent death in the family, a break up, and high stress at work. Patient says he's been feeling defeated lately. He notes he takes the Klonopin in order to help with his stress. Friend reports that she contacted the patient's therapist who suggested to call the patient and if he did not answer, to call EMS. Friend said that on arrival to the patient's house after he didn't answer, she noticed the patient was altered. She noticed an empty bottle of klonopin that was filled a year ago and a full bottle of Klonopin that filled recently. Denies any other meds or substances. He says he was not planning to hurt himself. Patient denies vomiting. He denies any ETOH consumption. Friend reports that about a year ago patient was going through the same stress as he is now and attempted to commit suicide. He was kept for observation at this time for 2 weeks.     The history is provided by the patient.     Review of patient's allergies indicates:  No Known Allergies  Past Medical History:   Diagnosis Date    Anxiety     Depression     History of psychiatric care     Hypertension      No past surgical history on file.  Family History   Problem Relation Age of Onset    " Cancer Mother          lung ca    Heart disease Mother         chf    Hypertension Mother     Asthma Sister     Drug abuse Sister     Depression Sister     Heart disease Maternal Grandfather         chf    Stroke Maternal Grandfather     Drug abuse Father      Social History     Tobacco Use    Smoking status: Never Smoker    Smokeless tobacco: Never Used   Substance Use Topics    Alcohol use: Yes     Alcohol/week: 1.0 standard drinks     Types: 1 Glasses of wine per week     Comment: 2 glasses of wine once or twice weekly    Drug use: No     Review of Systems  General: No fever.  No chills.  Eyes: No visual changes.  Head: No headache.    Integument: No rashes or lesions.  Chest: No shortness of breath.  Cardiovascular: No chest pain.  Abdomen: No abdominal pain.  No nausea or vomiting.  Urinary: No abnormal urination.  Neurologic: No focal weakness.  No numbness.  Hematologic: No easy bruising.  Endocrine: No excessive thirst or urination.  Psychiatric: Positive for dysphoric mood.    Physical Exam     Initial Vitals [20 1843]   BP Pulse Resp Temp SpO2   (!) 133/91 108 20 97.7 °F (36.5 °C) 100 %      MAP       --         Physical Exam  Appearance: No acute distress but appears drowsy.  HEENT: Normocephalic. Atraumatic. No conjunctival injection. EOMI. PERRL.   Neck: Neck supple.    Chest: No respiratory distress  Cardiovascular: Borderline tachycardic. +2 radial pulses bilaterally.  Abdomen: Soft. Not distended. Nontender. No guarding. No rebound. No Masses  Musculoskeletal: Good range of motion all joints. No deformities.    Neurologic: Alert and oriented x 3.  Equal strength in upper and lower extremities bilaterally. Normal sensation. No facial droop. Slurred speech.   Psych:  Sluggish, conversant. Denies SI or HI.  Integumentary: No rashes seen.  Good turgor.  No abrasions.  No ecchymoses.    ED Course   Procedures  Labs Reviewed   CBC W/ AUTO DIFFERENTIAL - Abnormal; Notable for the  following components:       Result Value    Mean Corpuscular Hemoglobin 31.7 (*)     All other components within normal limits   COMPREHENSIVE METABOLIC PANEL - Abnormal; Notable for the following components:    CO2 21 (*)     All other components within normal limits   URINALYSIS, REFLEX TO URINE CULTURE - Abnormal; Notable for the following components:    Specific Gravity, UA >=1.030 (*)     All other components within normal limits    Narrative:     Preferred Collection Type->Urine, Clean Catch   ACETAMINOPHEN LEVEL - Abnormal; Notable for the following components:    Acetaminophen (Tylenol), Serum <3.0 (*)     All other components within normal limits   TSH   DRUG SCREEN PANEL, URINE EMERGENCY    Narrative:     Preferred Collection Type->Urine, Clean Catch   ALCOHOL,MEDICAL (ETHANOL)     EKG Readings: (Independently Interpreted)   1944: Sinus tachycardia, rate 115, rightward axis, normal intervals, no STEMI.          Medical Decision Making:   Clinical Tests:   Lab Tests: Ordered and Reviewed  Medical Tests: Ordered and Reviewed                   ED Course as of Jan 06 0753   Sun Jan 05, 2020   2310 Patient signed out by Dr. Wisdom pending reassessment.  Patient satting well on room air labs done observed while in the emergency room safer planned transfer patient is medically clear.    [DC]      ED Course User Index  [DC] Tim Thurman Jr., MD                Clinical Impression:     1. Overdose         35 yo male presents for evaluation after taking about 16 klonopin. Exam shows borderline tachycardia, otherwise VSS, afeb. Slight slurred speech. Maintaining airway and secretions. Denies SI and HI. EKG with normal intervals. Pt placed on monitor. Labs reassuring with UDS pos for benzos. Telepysch evaluated and felt should PEC -- completed. Care transferred to EDMD Thurman with plan to observe till clinically sober prior to placement in psych facility. Discussed with poison control who recs obs for 4  hours.    I, Dr. Yessica Wisdom, personally performed the services described in this documentation. All medical record entries made by the scribe were at my direction and in my presence.  I have reviewed the chart and agree that the record reflects my personal performance and is accurate and complete. Yessica Wisdom MD.  9:02 PM 01/05/2020               Yessica Wisdom MD  01/05/20 2106       Yessica Wisdom MD  01/05/20 1952       Yessica Wisdom MD  01/06/20 2826

## 2020-01-06 NOTE — HPI
33yo male patient presented to ER with c/o over dose of klonopin. He was brought to ER. Admitted to psych and medicine consulted for H/P. He reports a hx of HTN  And was taking losartan. Also was taking coreg for palpitations.

## 2020-01-06 NOTE — CONSULTS
"[facility]  Psychiatry  Telepsychiatry Consult Note    Please see previous notes:  Patient agreeable to consultation via telepsychiatry.  Tele-Consultation from Psychiatry started: 1/5/2020 at 2010  The chief complaint leading to psychiatric consultation is: SI/klonopin OD  This consultation was requested by dr mckinney, the Emergency Department attending physician.  The location of the consulting psychiatrist is [address] [place] [address].  The patient location is  [facility] EMERGENCY DEPARTMENT   The patient arrived at the ED at: 1830    Also present with the patient at the time of the consultation: ed rn, pts friend  Patient Identification:   Schuyler Calles Jr. is a 34 y.o. male.  Patient information was obtained from patient, caregiver / friend and past medical records.  Patient presented voluntarily to the Emergency Department by ems.    Consults  Subjective:     History of Present Illness: This is a 33 y/o WM that presented to the ED earlier this evening 2/2 "Pt is a 34 y.o. male w/h/o TN, depression, anxiety, and suicide attempts, who presents to the ED via EMS after taking possibly multiple doses of Klonopin earlier today. Patient believes he did not take more than 16 doses of 0.5 MG of Klonopin today. Friend reports patient has been going through a lot of stress lately due to recent death in the family, a break up, and high stress at work. Patient says he's been feeling defeated lately. He notes he takes the Klonopin in order to help with his stress. Friend reports that she contacted the patient's therapist who suggested to call the patient and if he did not answer, to call EMS. Friend said that on arrival to the patient's house after he didn't answer, she noticed the patient was altered. She noticed an empty bottle of klonopin that was filled a year ago and a full bottle of Klonopin that filled recently. He says he was not planning to hurt himself. Patient denies vomiting. He denies any " "ETOH consumption. Friend reports that about a year ago patient was going through the same stress as he is now and attempted to commit suicide. He was kept for observation at this time for 2 weeks." On exam, the pt is intoxicated after taking 8 mg of klonopin and is difficult to understand. The primary historian is the pt's friend at the bedside, who called EMS. She reports, that the pt's aunt  after carlos and this was a significant loss to the pt, as his parents  10 years ago. In addition, she reports, that the pt had a recent break-up, that he has had trouble processing. Lastly, she reports, that the pt's work is his life, and when work is going well, he does well. There has been a change at his employer, and now the pt answers to 3 different supervisors, making it chaotic for the pt. The pt reports, that he had similar stressors, a year ago and was hospitalized. Denies other hospitalizations or attempts. Reports, great uncle suicided. The pt follow with o/p psychiatry and has had trouble getting an appt, waiting upwards of six weeks. +ve -ve HI, -ve AVH.         Psychiatric Mental Status Exam:  Arousal: stuperous  Sensorium/Orientation: oriented to person, place  Behavior/Cooperation: cooperative, psychomotor retardation   Speech: slowed, dysarthia, articulation error, delayed, non-spontaneous  Language: grossly intact  Mood: " ok "   Affect: depressed and euphoric  Thought Process: logical  Thought Content:   Auditory hallucinations: NO  Visual hallucinations: NO  Paranoia: NO  Delusions:  NO  Suicidal ideation: YES:      Homicidal ideation: NO  Insight: poor awareness of illness  Judgment: limited      Past Medical History:   Past Medical History:   Diagnosis Date    Anxiety     Depression     History of psychiatric care     Hypertension       Laboratory Data:   Labs Reviewed   CBC W/ AUTO DIFFERENTIAL - Abnormal; Notable for the following components:       Result Value    Mean Corpuscular " Hemoglobin 31.7 (*)     All other components within normal limits   URINALYSIS, REFLEX TO URINE CULTURE - Abnormal; Notable for the following components:    Specific Gravity, UA >=1.030 (*)     All other components within normal limits    Narrative:     Preferred Collection Type->Urine, Clean Catch   COMPREHENSIVE METABOLIC PANEL   TSH   DRUG SCREEN PANEL, URINE EMERGENCY   ALCOHOL,MEDICAL (ETHANOL)   ACETAMINOPHEN LEVEL       Review of patient's allergies indicates:  No Known Allergies  Medications in ER: Medications - No data to display  Medications at home: zoloft, wellbutrin, klonopin, prazosin  No new subjective & objective note has been filed under this hospital service since the last note was generated.      Assessment - Diagnosis - Goals:     Diagnosis/Impression:   - Benzodiazapine intoxication  - MDD recurrent severe  - Anxiety unspecified  - Nightmare d/o    Rec:   - PEC   - Transfer to inpt unit for stablization     Time with patient:  50 min  More than 50% of the time was spent counseling/coordinating care  Consulting clinician was informed of the encounter and consult note.  Consultation ended: 1/5/2020 at 2050    Omero Wade MD, MRO   Psychiatry  [facility]

## 2020-01-06 NOTE — PLAN OF CARE
"  Problem: Adult Behavioral Health Plan of Care  Goal: Optimized Coping Skills in Response to Life Stressors  Intervention: Promote Effective Coping Strategies  Flowsheets (Taken 1/6/2020 2902)  Supportive Measures: active listening utilized; counseling provided; positive reinforcement provided; verbalization of feelings encouraged; problem solving facilitated; decision-making supported; relaxation techniques promoted; goal setting facilitated; self-care encouraged; self-reflection promoted; self-responsibility promoted; journaling promoted     Behavior: Patient did not attend psychotherapeutic group.     Intervention:  will engage patients in a CBT based process group with a focus on cognitive distortions and discovering ways to "untangle thinking." Patients will be encouraged to apply the common cognitive distortions to some area of their life and acknowledge tools they can implement to counteract and restructure. Patients will be given time to express thoughts, concerns and goals for treatment and discharge, as well as perceived barriers to progress.     Response: Patient did not attend group.     Plan:  will continue to encourage patient to explore and verbalize emotions, set goals to aid in preventing re-hospitalization, attend psychotherapeutic group, and follow up with aftercare appointments.          "

## 2020-01-06 NOTE — ED NOTES
Called [facility] for tele psych consult. States to call back at 2000 when consult is available.

## 2020-01-06 NOTE — CONSULTS
"  [facility]  Adult Nutrition  Consult Note    SUMMARY     Recommendations    Recommendation:   1. Consider adding Boost Plus with meals to diet order support nutritional intake;  2. Regular Diet is appropriate a this time      - Consider changing diet to low sodium if BP is continuously elevated;  3. encourage adequate intake of meals      Goals: pt to increase meal intake to at least 50% by f/u  Nutrition Goal Status: new  Communication of RD Recs: other (comment)    Reason for Assessment    Reason For Assessment: consult  Diagnosis: psychological disorder  Relevant Medical History: HTN, Depression, psych    General Information Comments: Meal intake is 0%. Wt fluctuations are normal within the past 6 months. NFPE not completed per psych status.    Nutrition Discharge Planning: Regular; low sodium if BP is continuously elevated    Nutrition Risk Screen    Nutrition Risk Screen: no indicators present    Nutrition/Diet History    Spiritual, Cultural Beliefs, Zoroastrian Practices, Values that Affect Care: no  Food Allergies: NKFA  Factors Affecting Nutritional Intake: depression    Anthropometrics    Temp: 96.7 °F (35.9 °C)  Height Method: Stated  Height: 5' 7" (170.2 cm)  Height (inches): 67 in  Weight Method: Standard Scale  Weight: 91.5 kg (201 lb 13 oz)  Weight (lb): 201.82 lb  Ideal Body Weight (IBW), Male: 148 lb  % Ideal Body Weight, Male (lb): 136.37 %  BMI (Calculated): 31.6  BMI Grade: 30 - 34.9- obesity - grade I     Lab/Procedures/Meds    Pertinent Labs Reviewed: reviewed  Pertinent Labs Comments: no pertinenet lab comments at this time  Pertinent Medications Reviewed: reviewed  Pertinent Medications Comments: folic acid, multivitamin    Estimated/Assessed Needs    Weight Used For Calorie Calculations: 91.2 kg (201 lb)  Energy Calorie Requirements (kcal): 2262  Energy Need Method: Kent-St Jeor(*1.25 AF for obesity)  Protein Requirements: 73 g(.8 g/kg)  Weight Used For Protein " Calculations: 91.2 kg (201 lb)     Estimated Fluid Requirement Method: RDA Method  RDA Method (mL): 1212     Nutrition Prescription Ordered    Current Diet Order: Regular    Evaluation of Received Nutrient/Fluid Intake     % Intake of Estimated Energy Needs: 0 - 25 %  % Meal Intake: 0 - 25 %    Nutrition Risk    Level of Risk/Frequency of Follow-up: (1-2x/wk)     Assessment and Plan  Nutrition Problem:  Inadequate energy intake    Related to (etiology):   psychosis    Signs and Symptoms (as evidenced by):   Meal intake 0%    Interventions:  General Healthful Diet    Nutrition Diagnosis Status:   New      Monitor and Evaluation    Food and Nutrient Intake: energy intake, food and beverage intake  Food and Nutrient Adminstration: diet order  Physical Activity and Function: nutrition-related ADLs and IADLs  Anthropometric Measurements: weight change  Nutrition-Focused Physical Findings: overall appearance     Nutrition Follow-Up    RD Follow-up?: Yes

## 2020-01-06 NOTE — SUBJECTIVE & OBJECTIVE
Past Medical History:   Diagnosis Date    Anxiety     Depression     History of psychiatric care     History of psychiatric hospitalization     feb.'19 was at [facility].    Hx of psychiatric care     currently on klonopin, wellbutrin, neurotin    Hypertension     Psychiatric problem     debilitating panic, mental illness since 20 yo    Suicide attempt     over dose in Feb '97.     Therapy     states sees JULIA López 2-3 times /week and also Dr Angelo Ramsay for medication adjustments       No past surgical history on file.    Review of patient's allergies indicates:   Allergen Reactions    Cat dander     Grass pollen-june grass standard        No current facility-administered medications on file prior to encounter.      Current Outpatient Medications on File Prior to Encounter   Medication Sig    buPROPion (WELLBUTRIN XL) 150 MG TB24 tablet TAKE 3 TABLETS (450MG      TOTAL) ONCE DAILY    carvedilol (COREG) 25 MG tablet TAKE 1 TABLET TWICE A DAY    gabapentin (NEURONTIN) 300 MG capsule Take 1 capsule (300 mg total) by mouth 2 (two) times daily.    losartan (COZAAR) 50 MG tablet TAKE 1 TABLET DAILY    multivitamin (ONE DAILY MULTIVITAMIN) per tablet Take 1 tablet by mouth once daily.    sertraline (ZOLOFT) 100 MG tablet Take 2 tablets (200 mg total) by mouth once daily.    TRUVADA 200-300 mg Tab Take 1 tablet by mouth once daily.     EPINEPHrine (EPIPEN) 0.3 mg/0.3 mL AtIn USE UTD    prazosin (MINIPRESS) 2 MG Cap Take 1 capsule (2 mg total) by mouth every evening.     Family History     Problem Relation (Age of Onset)    Asthma Sister    Cancer Mother    Depression Sister    Drug abuse Sister, Father    Heart disease Mother, Maternal Grandfather    Hypertension Mother    Stroke Maternal Grandfather        Tobacco Use    Smoking status: Never Smoker    Smokeless tobacco: Never Used   Substance and Sexual Activity    Alcohol use: Yes     Alcohol/week: 1.0 standard drinks     Types: 1 Glasses of  wine per week     Comment: 2 glasses of wine once or twice weekly    Drug use: No    Sexual activity: Yes     Partners: Male     Review of Systems   Constitutional: Negative for chills and fever.   Respiratory: Negative for chest tightness and shortness of breath.    Cardiovascular: Negative for chest pain and palpitations.   Gastrointestinal: Negative for abdominal distention, abdominal pain, blood in stool and vomiting.   Genitourinary: Negative for dysuria, flank pain, hematuria and urgency.   Musculoskeletal: Negative for back pain and neck pain.   Skin: Negative for rash and wound.   Neurological: Negative for dizziness, weakness and numbness.   Hematological: Does not bruise/bleed easily.   Psychiatric/Behavioral: Positive for self-injury. Negative for agitation and suicidal ideas. The patient is not nervous/anxious.      Objective:     Vital Signs (Most Recent):  Temp: 97.8 °F (36.6 °C) (01/06/20 0747)  Pulse: 100 (01/06/20 0747)  Resp: 18 (01/06/20 0747)  BP: 115/73 (01/06/20 0747) Vital Signs (24h Range):  Temp:  [97 °F (36.1 °C)-98 °F (36.7 °C)] 97.8 °F (36.6 °C)  Pulse:  [] 100  Resp:  [18-20] 18  SpO2:  [96 %-100 %] 96 %  BP: (115-133)/(73-91) 115/73     Weight: 91.6 kg (201 lb 13.3 oz)  Body mass index is 31.61 kg/m².    Physical Exam   Constitutional: He is oriented to person, place, and time. He appears well-developed and well-nourished.   HENT:   Head: Normocephalic and atraumatic.   Neck: Normal range of motion. Neck supple. No thyromegaly present.   Cardiovascular: Normal rate, regular rhythm, normal heart sounds and intact distal pulses.   No murmur heard.  Pulmonary/Chest: Effort normal and breath sounds normal. No respiratory distress. He has no wheezes. He has no rales.   Abdominal: Soft. Bowel sounds are normal. He exhibits no distension. There is no tenderness.   Musculoskeletal: Normal range of motion. He exhibits no edema or deformity.   Neurological: He is alert and oriented to  person, place, and time.   Neuro: Cranial nerves:  CN II Visual fields full to confrontation.   CN III, IV, VI Pupils are equal, round, and reactive to light.  CN III: no palsy  Nystagmus: none   Diplopia: none  Ophthalmoparesis: none  CN V Facial sensation intact.   CN VII Facial expression full, symmetric.   CN VIII normal.   CN IX normal.   CN X normal.   CN XI normal.   CN XII normal.     Skin: Skin is warm and dry.   Psychiatric: He has a normal mood and affect. His behavior is normal. Thought content normal.   Nursing note and vitals reviewed.      Significant Labs:   U/A  Results for orders placed or performed in visit on 08/13/15   Urinalysis   Result Value Ref Range    Specimen UA Urine, Clean Catch     Color, UA Yellow Yellow, Straw, Kimmie    Appearance, UA Clear Clear    pH, UA 7.0 5.0 - 8.0    Specific Gravity, UA 1.005 1.005 - 1.030    Protein, UA Negative Negative    Glucose, UA Negative Negative    Ketones, UA Negative Negative    Bilirubin (UA) Negative Negative    Occult Blood UA Negative Negative    Nitrite, UA Negative Negative    Urobilinogen, UA Negative <2.0 EU/dL    Leukocytes, UA Negative Negative     UDS  Results for orders placed or performed during the hospital encounter of 01/05/20   Drug screen panel, emergency   Result Value Ref Range    Benzodiazepines Presumptive Positive     Methadone metabolites Negative     Cocaine (Metab.) Negative     Opiate Scrn, Ur Negative     Barbiturate Screen, Ur Negative     Amphetamine Screen, Ur Negative     THC Negative     Phencyclidine Negative     Creatinine, Random Ur 164.9 23.0 - 375.0 mg/dL    Toxicology Information SEE COMMENT      CBC  Results for orders placed or performed during the hospital encounter of 01/05/20   CBC auto differential   Result Value Ref Range    WBC 5.81 3.90 - 12.70 K/uL    RBC 4.64 4.60 - 6.20 M/uL    Hemoglobin 14.7 14.0 - 18.0 g/dL    Hematocrit 42.0 40.0 - 54.0 %    Mean Corpuscular Volume 91 82 - 98 fL    Mean  Corpuscular Hemoglobin 31.7 (H) 27.0 - 31.0 pg    Mean Corpuscular Hemoglobin Conc 35.0 32.0 - 36.0 g/dL    RDW 12.6 11.5 - 14.5 %    Platelets 212 150 - 350 K/uL    MPV 10.2 9.2 - 12.9 fL    Gran # (ANC) 3.2 1.8 - 7.7 K/uL    Lymph # 1.9 1.0 - 4.8 K/uL    Mono # 0.5 0.3 - 1.0 K/uL    Eos # 0.2 0.0 - 0.5 K/uL    Baso # 0.01 0.00 - 0.20 K/uL    Gran% 55.5 38.0 - 73.0 %    Lymph% 33.0 18.0 - 48.0 %    Mono% 8.4 4.0 - 15.0 %    Eosinophil% 2.9 0.0 - 8.0 %    Basophil% 0.2 0.0 - 1.9 %    Differential Method Automated      CMP  Results for orders placed or performed during the hospital encounter of 01/05/20   Comprehensive metabolic panel   Result Value Ref Range    Sodium 142 136 - 145 mmol/L    Potassium 3.8 3.5 - 5.1 mmol/L    Chloride 108 95 - 110 mmol/L    CO2 21 (L) 23 - 29 mmol/L    Glucose 104 70 - 110 mg/dL    BUN, Bld 18 6 - 20 mg/dL    Creatinine 1.0 0.5 - 1.4 mg/dL    Calcium 9.7 8.7 - 10.5 mg/dL    Total Protein 7.8 6.0 - 8.4 g/dL    Albumin 4.5 3.5 - 5.2 g/dL    Total Bilirubin 0.8 0.1 - 1.0 mg/dL    Alkaline Phosphatase 115 55 - 135 U/L    AST 22 10 - 40 U/L    ALT 32 10 - 44 U/L    Anion Gap 13 8 - 16 mmol/L    eGFR if African American >60 >60 mL/min/1.73 m^2    eGFR if non African American >60 >60 mL/min/1.73 m^2     TSH  Results for orders placed or performed during the hospital encounter of 01/05/20   TSH   Result Value Ref Range    TSH 1.167 0.400 - 4.000 uIU/mL     ETOH  Results for orders placed or performed during the hospital encounter of 01/05/20   Ethanol   Result Value Ref Range    Alcohol, Medical, Serum <10 <10 mg/dL     Acetaminophen  Results for orders placed or performed during the hospital encounter of 01/05/20   Acetaminophen level   Result Value Ref Range    Acetaminophen (Tylenol), Serum <3.0 (L) 10.0 - 20.0 ug/mL

## 2020-01-06 NOTE — CONSULTS
[facility] Medicine  Consult Note    Patient Name: Schuyler Calles Jr.  MRN: [id number]  Admission Date: 1/6/2020  Hospital Length of Stay: 0 days  Attending Physician: Kelvin Browne MD   Primary Care Provider: Russell Ortiz MD           Patient information was obtained from patient and ER records.     Inpatient consult to [facility] for History and Physical  Consult performed by: Susana Haro NP  Consult ordered by: Kelvin Browne MD        Subjective:     Principal Problem: MDD (major depressive disorder), recurrent severe, without psychosis    Chief Complaint: No chief complaint on file.       HPI: 33yo male patient presented to ER with c/o over dose of klonopin. He was brought to ER. Admitted to psych and medicine consulted for H/P. He reports a hx of HTN  And was taking losartan. Also was taking coreg for palpitations.     Past Medical History:   Diagnosis Date    Anxiety     Depression     History of psychiatric care     History of psychiatric hospitalization     feb.'19 was at [place].    Hx of psychiatric care     currently on klonopin, wellbutrin, neurotin    Hypertension     Psychiatric problem     debilitating panic, mental illness since 20 yo    Suicide attempt     over dose in Feb '97.     Therapy     states sees JULIA López 2-3 times /week and also Dr Angelo Ramsay for medication adjustments       No past surgical history on file.    Review of patient's allergies indicates:   Allergen Reactions    Cat dander     Grass pollen-june grass standard        No current facility-administered medications on file prior to encounter.      Current Outpatient Medications on File Prior to Encounter   Medication Sig    buPROPion (WELLBUTRIN XL) 150 MG TB24 tablet TAKE 3 TABLETS (450MG      TOTAL) ONCE DAILY    carvedilol (COREG) 25 MG tablet TAKE 1 TABLET TWICE A DAY    gabapentin (NEURONTIN) 300 MG capsule Take 1 capsule (300 mg total) by  mouth 2 (two) times daily.    losartan (COZAAR) 50 MG tablet TAKE 1 TABLET DAILY    multivitamin (ONE DAILY MULTIVITAMIN) per tablet Take 1 tablet by mouth once daily.    sertraline (ZOLOFT) 100 MG tablet Take 2 tablets (200 mg total) by mouth once daily.    TRUVADA 200-300 mg Tab Take 1 tablet by mouth once daily.     EPINEPHrine (EPIPEN) 0.3 mg/0.3 mL AtIn USE UTD    prazosin (MINIPRESS) 2 MG Cap Take 1 capsule (2 mg total) by mouth every evening.     Family History     Problem Relation (Age of Onset)    Asthma Sister    Cancer Mother    Depression Sister    Drug abuse Sister, Father    Heart disease Mother, Maternal Grandfather    Hypertension Mother    Stroke Maternal Grandfather        Tobacco Use    Smoking status: Never Smoker    Smokeless tobacco: Never Used   Substance and Sexual Activity    Alcohol use: Yes     Alcohol/week: 1.0 standard drinks     Types: 1 Glasses of wine per week     Comment: 2 glasses of wine once or twice weekly    Drug use: No    Sexual activity: Yes     Partners: Male     Review of Systems   Constitutional: Negative for chills and fever.   Respiratory: Negative for chest tightness and shortness of breath.    Cardiovascular: Negative for chest pain and palpitations.   Gastrointestinal: Negative for abdominal distention, abdominal pain, blood in stool and vomiting.   Genitourinary: Negative for dysuria, flank pain, hematuria and urgency.   Musculoskeletal: Negative for back pain and neck pain.   Skin: Negative for rash and wound.   Neurological: Negative for dizziness, weakness and numbness.   Hematological: Does not bruise/bleed easily.   Psychiatric/Behavioral: Positive for self-injury. Negative for agitation and suicidal ideas. The patient is not nervous/anxious.      Objective:     Vital Signs (Most Recent):  Temp: 97.8 °F (36.6 °C) (01/06/20 0747)  Pulse: 100 (01/06/20 0747)  Resp: 18 (01/06/20 0747)  BP: 115/73 (01/06/20 0747) Vital Signs (24h Range):  Temp:  [97 °F  (36.1 °C)-98 °F (36.7 °C)] 97.8 °F (36.6 °C)  Pulse:  [] 100  Resp:  [18-20] 18  SpO2:  [96 %-100 %] 96 %  BP: (115-133)/(73-91) 115/73     Weight: 91.6 kg (201 lb 13.3 oz)  Body mass index is 31.61 kg/m².    Physical Exam   Constitutional: He is oriented to person, place, and time. He appears well-developed and well-nourished.   HENT:   Head: Normocephalic and atraumatic.   Neck: Normal range of motion. Neck supple. No thyromegaly present.   Cardiovascular: Normal rate, regular rhythm, normal heart sounds and intact distal pulses.   No murmur heard.  Pulmonary/Chest: Effort normal and breath sounds normal. No respiratory distress. He has no wheezes. He has no rales.   Abdominal: Soft. Bowel sounds are normal. He exhibits no distension. There is no tenderness.   Musculoskeletal: Normal range of motion. He exhibits no edema or deformity.   Neurological: He is alert and oriented to person, place, and time.   Neuro: Cranial nerves:  CN II Visual fields full to confrontation.   CN III, IV, VI Pupils are equal, round, and reactive to light.  CN III: no palsy  Nystagmus: none   Diplopia: none  Ophthalmoparesis: none  CN V Facial sensation intact.   CN VII Facial expression full, symmetric.   CN VIII normal.   CN IX normal.   CN X normal.   CN XI normal.   CN XII normal.     Skin: Skin is warm and dry.   Psychiatric: He has a normal mood and affect. His behavior is normal. Thought content normal.   Nursing note and vitals reviewed.      Significant Labs:   U/A  Results for orders placed or performed in visit on 08/13/15   Urinalysis   Result Value Ref Range    Specimen UA Urine, Clean Catch     Color, UA Yellow Yellow, Straw, Kimmie    Appearance, UA Clear Clear    pH, UA 7.0 5.0 - 8.0    Specific Gravity, UA 1.005 1.005 - 1.030    Protein, UA Negative Negative    Glucose, UA Negative Negative    Ketones, UA Negative Negative    Bilirubin (UA) Negative Negative    Occult Blood UA Negative Negative    Nitrite, UA  Negative Negative    Urobilinogen, UA Negative <2.0 EU/dL    Leukocytes, UA Negative Negative     UDS  Results for orders placed or performed during the hospital encounter of 01/05/20   Drug screen panel, emergency   Result Value Ref Range    Benzodiazepines Presumptive Positive     Methadone metabolites Negative     Cocaine (Metab.) Negative     Opiate Scrn, Ur Negative     Barbiturate Screen, Ur Negative     Amphetamine Screen, Ur Negative     THC Negative     Phencyclidine Negative     Creatinine, Random Ur 164.9 23.0 - 375.0 mg/dL    Toxicology Information SEE COMMENT      CBC  Results for orders placed or performed during the hospital encounter of 01/05/20   CBC auto differential   Result Value Ref Range    WBC 5.81 3.90 - 12.70 K/uL    RBC 4.64 4.60 - 6.20 M/uL    Hemoglobin 14.7 14.0 - 18.0 g/dL    Hematocrit 42.0 40.0 - 54.0 %    Mean Corpuscular Volume 91 82 - 98 fL    Mean Corpuscular Hemoglobin 31.7 (H) 27.0 - 31.0 pg    Mean Corpuscular Hemoglobin Conc 35.0 32.0 - 36.0 g/dL    RDW 12.6 11.5 - 14.5 %    Platelets 212 150 - 350 K/uL    MPV 10.2 9.2 - 12.9 fL    Gran # (ANC) 3.2 1.8 - 7.7 K/uL    Lymph # 1.9 1.0 - 4.8 K/uL    Mono # 0.5 0.3 - 1.0 K/uL    Eos # 0.2 0.0 - 0.5 K/uL    Baso # 0.01 0.00 - 0.20 K/uL    Gran% 55.5 38.0 - 73.0 %    Lymph% 33.0 18.0 - 48.0 %    Mono% 8.4 4.0 - 15.0 %    Eosinophil% 2.9 0.0 - 8.0 %    Basophil% 0.2 0.0 - 1.9 %    Differential Method Automated      CMP  Results for orders placed or performed during the hospital encounter of 01/05/20   Comprehensive metabolic panel   Result Value Ref Range    Sodium 142 136 - 145 mmol/L    Potassium 3.8 3.5 - 5.1 mmol/L    Chloride 108 95 - 110 mmol/L    CO2 21 (L) 23 - 29 mmol/L    Glucose 104 70 - 110 mg/dL    BUN, Bld 18 6 - 20 mg/dL    Creatinine 1.0 0.5 - 1.4 mg/dL    Calcium 9.7 8.7 - 10.5 mg/dL    Total Protein 7.8 6.0 - 8.4 g/dL    Albumin 4.5 3.5 - 5.2 g/dL    Total Bilirubin 0.8 0.1 - 1.0 mg/dL    Alkaline Phosphatase 115 55  - 135 U/L    AST 22 10 - 40 U/L    ALT 32 10 - 44 U/L    Anion Gap 13 8 - 16 mmol/L    eGFR if African American >60 >60 mL/min/1.73 m^2    eGFR if non African American >60 >60 mL/min/1.73 m^2     TSH  Results for orders placed or performed during the hospital encounter of 01/05/20   TSH   Result Value Ref Range    TSH 1.167 0.400 - 4.000 uIU/mL     ETOH  Results for orders placed or performed during the hospital encounter of 01/05/20   Ethanol   Result Value Ref Range    Alcohol, Medical, Serum <10 <10 mg/dL     Acetaminophen  Results for orders placed or performed during the hospital encounter of 01/05/20   Acetaminophen level   Result Value Ref Range    Acetaminophen (Tylenol), Serum <3.0 (L) 10.0 - 20.0 ug/mL             Assessment/Plan:     Palpitation  Resume coreg      Depression  Further orders per psych      Essential hypertension  Resume losartan        VTE Risk Mitigation (From admission, onward)    None              Thank you for your consult. I will sign off. Please contact us if you have any additional questions.    Susana Haro NP  Department of Hospital Medicine   [facility]
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
## 2020-01-07 LAB
CHOLEST SERPL-MCNC: 254 MG/DL (ref 120–199)
CHOLEST/HDLC SERPL: 7.1 {RATIO} (ref 2–5)
ESTIMATED AVG GLUCOSE: 91 MG/DL (ref 68–131)
HBA1C MFR BLD HPLC: 4.8 % (ref 4–5.6)
HDLC SERPL-MCNC: 36 MG/DL (ref 40–75)
HDLC SERPL: 14.2 % (ref 20–50)
LDLC SERPL CALC-MCNC: 193.2 MG/DL (ref 63–159)
NONHDLC SERPL-MCNC: 218 MG/DL
TRIGL SERPL-MCNC: 124 MG/DL (ref 30–150)

## 2020-01-07 PROCEDURE — 25000003 PHARM REV CODE 250: Performed by: PSYCHIATRY & NEUROLOGY

## 2020-01-07 PROCEDURE — 83036 HEMOGLOBIN GLYCOSYLATED A1C: CPT

## 2020-01-07 PROCEDURE — 11400000 HC PSYCH PRIVATE ROOM

## 2020-01-07 PROCEDURE — 90833 PR PSYCHOTHERAPY W/PATIENT W/E&M, 30 MIN (ADD ON): ICD-10-PCS | Mod: ,,, | Performed by: PSYCHIATRY & NEUROLOGY

## 2020-01-07 PROCEDURE — 36415 COLL VENOUS BLD VENIPUNCTURE: CPT

## 2020-01-07 PROCEDURE — 99233 SBSQ HOSP IP/OBS HIGH 50: CPT | Mod: ,,, | Performed by: PSYCHIATRY & NEUROLOGY

## 2020-01-07 PROCEDURE — 99233 PR SUBSEQUENT HOSPITAL CARE,LEVL III: ICD-10-PCS | Mod: ,,, | Performed by: PSYCHIATRY & NEUROLOGY

## 2020-01-07 PROCEDURE — 80061 LIPID PANEL: CPT

## 2020-01-07 PROCEDURE — 90833 PSYTX W PT W E/M 30 MIN: CPT | Mod: ,,, | Performed by: PSYCHIATRY & NEUROLOGY

## 2020-01-07 RX ORDER — GABAPENTIN 400 MG/1
400 CAPSULE ORAL 2 TIMES DAILY
Status: DISCONTINUED | OUTPATIENT
Start: 2020-01-07 | End: 2020-01-08

## 2020-01-07 RX ADMIN — GABAPENTIN 400 MG: 400 CAPSULE ORAL at 08:01

## 2020-01-07 RX ADMIN — CARVEDILOL 25 MG: 12.5 TABLET, FILM COATED ORAL at 08:01

## 2020-01-07 RX ADMIN — CARVEDILOL 25 MG: 12.5 TABLET, FILM COATED ORAL at 09:01

## 2020-01-07 RX ADMIN — GABAPENTIN 300 MG: 300 CAPSULE ORAL at 09:01

## 2020-01-07 RX ADMIN — FOLIC ACID 1 MG: 1 TABLET ORAL at 09:01

## 2020-01-07 RX ADMIN — BUPROPION HYDROCHLORIDE 450 MG: 150 TABLET, EXTENDED RELEASE ORAL at 09:01

## 2020-01-07 RX ADMIN — ALUMINUM HYDROXIDE, MAGNESIUM HYDROXIDE, AND SIMETHICONE 30 ML: 200; 200; 20 SUSPENSION ORAL at 08:01

## 2020-01-07 RX ADMIN — SERTRALINE HYDROCHLORIDE 200 MG: 100 TABLET ORAL at 09:01

## 2020-01-07 RX ADMIN — THERA TABS 1 TABLET: TAB at 09:01

## 2020-01-07 RX ADMIN — LOSARTAN POTASSIUM 50 MG: 50 TABLET, FILM COATED ORAL at 09:01

## 2020-01-07 NOTE — PLAN OF CARE
Plan of care reviewed with patient, patient is tearful and labile and having difficulty concentrating. Denies intent to hurt himself or others. Has not eaten anything since admitted to unit, Dr. Browne made aware of this. Patient is focused on contacting his uncle so he can inform him of his whereabouts. Resting frequently in room. Gait steady, no falls. Pathways and environment clear and clutter-free. Compliant with medication. Constantly twirls hair on top of scalp, states it is a 'nervous habit'. Promoted an individualized safety plan, effective coping skills, improved sleep patterns, improved social interactions, methods for relaxation, mood improvement, and reducing stress and anxiety.  Will continue to monitor for safety.
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
## 2020-01-07 NOTE — PROGRESS NOTES
"   01/07/20 1040   [facility] Group Therapy   Group Name Leisure Education   Specific Interventions Cognitive Stimulation Training  (identify with healthy leisure activity)   Participation Level Active;Appropriate;Attentive;Sharing   Participation Quality Cooperative   Insight/Motivation Applies New Skills;Improved   Affect/Mood Display Depressed   Cognition Alert   Psychomotor WNL   Patient asked what day it was and when did he get here. Patient states he likes reading to help preoccupy his mind. Staff showed patient book that are available on the unit. Patient chose to read "Calming The Emotional Storm."  "

## 2020-01-07 NOTE — PLAN OF CARE
Pt spent entire shift in bed. Had been on phone earlier, pt denies any feelings of wanting to hurt himself. meds taken but pt did only eat alittle bit after much promptings from RN

## 2020-01-07 NOTE — PLAN OF CARE
Pt.  Slept  10 Hours  so far this shift without problems noted. q 15 min safety checks done this shift. Safety and comfort maintained. Cont. Plan.

## 2020-01-07 NOTE — PSYCH
"problem: Adult Behavioral Health Plan of Care  Goal: Optimized Coping Skills in Response to Life Stressors  Intervention: Promote Effective Coping Strategies  Supportive Measures: active listening utilized; counseling provided; positive reinforcement provided; verbalization of feelings encouraged; problem solving facilitated; relaxation techniques promoted; self-care encouraged; self-reflection promoted; self-responsibility promoted     Behavior: Patient attended social service group dressed in personal clothing, relaxed posture- maintained eye contact most of the time but held her head down periodically. Patient remained focused on group topic.     Intervention: Social  will engage patients in group with a focus on issues from the past.  patient was given a hand out, "TOSSING THAT OLD BAGGAGE." Patients will be encouraged to apply the concept of getting rid of old baggage or issues to  their life and acknowledge tools they can implement to counteract and restructure. Patients will be given time to express thoughts, concerns and goals for treatment and discharge, as well as perceived barriers to progress.     Response: Patient remained focused on group topic. Pt  was attentive and participated in the group discussion. He was asked to identify baggage from his past; therefore, the patient was able to identify, fear of failure, not being good enough. Pain from past relationships with his family,and success being my measure of self worth.  The patient also identified ways to resolve these past issues as, "learning coping skills by working  With my therapist, changing the way I think, accept that perfection is not possible.    Plan: Social  will continue to encourage patient to explore and verbalize emotions, set goals to aid in preventing re-hospitalization, attend psychotherapeutic group, and follow up with aftercare appointments.      "

## 2020-01-08 PROCEDURE — 99233 PR SUBSEQUENT HOSPITAL CARE,LEVL III: ICD-10-PCS | Mod: ,,, | Performed by: PSYCHIATRY & NEUROLOGY

## 2020-01-08 PROCEDURE — 90833 PR PSYCHOTHERAPY W/PATIENT W/E&M, 30 MIN (ADD ON): ICD-10-PCS | Mod: ,,, | Performed by: PSYCHIATRY & NEUROLOGY

## 2020-01-08 PROCEDURE — 90833 PSYTX W PT W E/M 30 MIN: CPT | Mod: ,,, | Performed by: PSYCHIATRY & NEUROLOGY

## 2020-01-08 PROCEDURE — 99233 SBSQ HOSP IP/OBS HIGH 50: CPT | Mod: ,,, | Performed by: PSYCHIATRY & NEUROLOGY

## 2020-01-08 PROCEDURE — 11400000 HC PSYCH PRIVATE ROOM

## 2020-01-08 PROCEDURE — 25000003 PHARM REV CODE 250: Performed by: PSYCHIATRY & NEUROLOGY

## 2020-01-08 RX ADMIN — LOSARTAN POTASSIUM 50 MG: 50 TABLET, FILM COATED ORAL at 08:01

## 2020-01-08 RX ADMIN — BUPROPION HYDROCHLORIDE 450 MG: 150 TABLET, EXTENDED RELEASE ORAL at 08:01

## 2020-01-08 RX ADMIN — CARVEDILOL 25 MG: 12.5 TABLET, FILM COATED ORAL at 08:01

## 2020-01-08 RX ADMIN — GABAPENTIN 500 MG: 100 CAPSULE ORAL at 08:01

## 2020-01-08 RX ADMIN — THERA TABS 1 TABLET: TAB at 08:01

## 2020-01-08 RX ADMIN — FOLIC ACID 1 MG: 1 TABLET ORAL at 08:01

## 2020-01-08 RX ADMIN — SERTRALINE HYDROCHLORIDE 200 MG: 100 TABLET ORAL at 08:01

## 2020-01-08 RX ADMIN — GABAPENTIN 400 MG: 400 CAPSULE ORAL at 08:01
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
## 2020-01-08 NOTE — PLAN OF CARE
Shift note : patient is cooperative . He is eating all meals and is taking all ordered medications . He is pleasant and is attending all groups to improve his coping skills .
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
## 2020-01-08 NOTE — PROGRESS NOTES
"   01/08/20 1040   [facility] Therapy   Group Name Leisure Skills Training   Specific Interventions Coping Skills Training  (healthy leisure activity&resource information given)   Participation Level Active;Appropriate;Attentive;Sharing   Participation Quality Cooperative;Social   Insight/Motivation Applies New Skills;Good   Affect/Mood Display Appropriate   Cognition Alert   Psychomotor WNL   Patient reports "really good" mood. Patient shows interest and initial ability to follow directions, problem solve, enjoys the activity, desires to win. Patient receptive to resource information given about skilled activity.  "

## 2020-01-08 NOTE — PROGRESS NOTES
"   01/07/20 9569   Assessment   Patient's Identification of the Problem Patient presents with anxious, tearful affect and "feel reinvigorated, resilent, everyone is so nice here" mood. Patient states his admit is due to overdose on Klonopin(old prescription), depression, anxiety. Patient states "I just wanted to disappear for 30 minutes and escape." Patient reports he hadn't been sleeping very well, been pretty depressed, his aunt passed December 26th, 2019 and this month is the 10 year anniversary of his mother's death. Patient reports work stress, new position requires more hours and problems with his ex-boyfriend. Patient denies suicidal thoughts and states "I just wanted to sleep. The truth is I don't want to die." Patient admits to negative leisure lifestyle of drinking alcohol 3 times per week. Patient reports he is  after 2 years, was dating a nikko, had a break-up Saturday before admit, no children, has BS in accounting and [employer]( Master's in Business Administration), employed as , lives alone in [place], [place]. Patient verbalized main goal "I love to have less anxiety and a more positive outlook on life." Patient states "self-awareness is such a positive thing. I struggle with having the awareness of knowing when to reframe the situation in a better light."   Leisure Interest Watching TV;Reading;Shopping;Exercise;Listen to Music;Movies;Enjoy Family/Friends;Decorating;Classical/Table Games;   Leisure Barriers Too Busy;Loss of Interest;Energy Level;Self Confidence;Fears/Phobias;Other (See Comments)  (anxiety,fear rejection, beng alone and not being loved)   Treatment Focus To Improve Mood;Increase Self Confidence;To Improve Leisure Awareness/Lifestyle/Interest;To Promote Successful and Safe Self Expression;To Improve Coping Skills;Increase Problems Solving and Decision Making Skills     Treatment Recommendation: To address problem(s) #  1:1 Intervention (as needed)    Cognitive Stimulation " Skilled Activity  Self Expression Skilled Activity  Mild Exercises Skilled Activity  Stress Management Skilled Activity  Coping Skilled Activity  Leisure Education and Awareness Skilled Activity    Treatment Goal(s):  Long Term Goals Refer To Master Treatment Plan    Short Term Treatment Goal(s)  Patient Will:  Exhibit Improvement in Mood  Demonstrate Constructive Expression of Feelings and Behavior  Identify at Least 2 Coping Skills or Leisure Skills to Reduce Depression and Hopelessness Upon Request from Therapist    Discharge Recommendations:  Encourage Patient to Actively Utilize Available Community Resources to Increase Leisure Involvement to Decrease Signs and Symptoms of Illness  Encourage Patient to Utilize Coping Skills on a Regular Basis to Reduce the Risk of Decompensating and Re-Hospitalizations  Follow Up with After Care Appointments  Continue with Current Leisure Activities
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
## 2020-01-09 PROCEDURE — 90833 PR PSYCHOTHERAPY W/PATIENT W/E&M, 30 MIN (ADD ON): ICD-10-PCS | Mod: ,,, | Performed by: PSYCHIATRY & NEUROLOGY

## 2020-01-09 PROCEDURE — 99233 PR SUBSEQUENT HOSPITAL CARE,LEVL III: ICD-10-PCS | Mod: ,,, | Performed by: PSYCHIATRY & NEUROLOGY

## 2020-01-09 PROCEDURE — 11400000 HC PSYCH PRIVATE ROOM

## 2020-01-09 PROCEDURE — 25000003 PHARM REV CODE 250: Performed by: PSYCHIATRY & NEUROLOGY

## 2020-01-09 PROCEDURE — 99233 SBSQ HOSP IP/OBS HIGH 50: CPT | Mod: ,,, | Performed by: PSYCHIATRY & NEUROLOGY

## 2020-01-09 PROCEDURE — 90833 PSYTX W PT W E/M 30 MIN: CPT | Mod: ,,, | Performed by: PSYCHIATRY & NEUROLOGY

## 2020-01-09 RX ADMIN — ACETAMINOPHEN 650 MG: 325 TABLET ORAL at 07:01

## 2020-01-09 RX ADMIN — ALUMINUM HYDROXIDE, MAGNESIUM HYDROXIDE, AND SIMETHICONE 30 ML: 200; 200; 20 SUSPENSION ORAL at 05:01

## 2020-01-09 RX ADMIN — CARVEDILOL 25 MG: 12.5 TABLET, FILM COATED ORAL at 08:01

## 2020-01-09 RX ADMIN — GABAPENTIN 500 MG: 100 CAPSULE ORAL at 08:01

## 2020-01-09 RX ADMIN — SERTRALINE HYDROCHLORIDE 200 MG: 100 TABLET ORAL at 08:01

## 2020-01-09 RX ADMIN — BUPROPION HYDROCHLORIDE 450 MG: 150 TABLET, EXTENDED RELEASE ORAL at 08:01

## 2020-01-09 RX ADMIN — LOSARTAN POTASSIUM 50 MG: 50 TABLET, FILM COATED ORAL at 08:01

## 2020-01-09 RX ADMIN — FOLIC ACID 1 MG: 1 TABLET ORAL at 08:01

## 2020-01-09 RX ADMIN — THERA TABS 1 TABLET: TAB at 08:01

## 2020-01-09 RX ADMIN — ALUMINUM HYDROXIDE, MAGNESIUM HYDROXIDE, AND SIMETHICONE 30 ML: 200; 200; 20 SUSPENSION ORAL at 11:01

## 2020-01-09 NOTE — PLAN OF CARE
Problem: Adult Behavioral Health Plan of Care  Goal: Plan of Care Review  Outcome: Ongoing, Progressing     Recommendation:   Please Add Boost Plus BID to supplement order to support nutritional intake    Goals: pt to increase meal intake to at least 50% by f/u  Nutrition Goal Status: progressing towards goal

## 2020-01-09 NOTE — PLAN OF CARE
Out and about in unit. Compliant with medication and unit routine. Mood nt as depressed today as yesterday. States feels better today. Good eye contact when conversing. Safety measures maintained. Will continue to monitor.
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
## 2020-01-09 NOTE — PROGRESS NOTES
"   01/09/20 1100   [facility] Group Therapy   Group Name Therapeutic Recreation   Specific Interventions Coping Skills Training   Participation Level Active;Supportive;Appropriate;Attentive   Participation Quality Cooperative;Social   Insight/Motivation Applies New Skills;Good   Affect/Mood Display Appropriate   Cognition Alert   Psychomotor WNL   Patient was with the counselor when group started. Patient motivated and willing to explore healthy coping skills to participate in during leisure time. Patient was challenged, used problem solving skills and friendly competition. Patient smiling and states "I appreciate that the staff here cares."  "

## 2020-01-09 NOTE — PLAN OF CARE
Problem: Adult Behavioral Health Plan of Care  Goal: Optimized Coping Skills in Response to Life Stressors  Intervention: Promote Effective Coping Strategies  Flowsheets (Taken 1/9/2020 9345)  Supportive Measures: active listening utilized; counseling provided; positive reinforcement provided; verbalization of feelings encouraged; problem solving facilitated; decision-making supported; relaxation techniques promoted; goal setting facilitated; self-care encouraged; guided imagery facilitated; self-reflection promoted; journaling promoted; self-responsibility promoted     Behavior: Patient attended psychotherapeutic group dressed in hospital scrubs, appropriate grooming with congruent affect and improved, hopeful mood, relaxed posture, and appropriate eye contact. Patient remained engaged and attentive.    Intervention:  engaged patients in a relaxation exercise involving 6 minutes of progressive muscle relaxation, followed by an exercise in practicing gratitude using Eleni's suggested five types of questions for social workers to ask in order to assess strengths in their clients: survival, support, exception, possibility, and esteem questions. This will allow patient's to add another coping skill in their tool box of skills and explore their strengths while intentionally breaking negative thought processes. Patients will be given time to express thoughts, concerns and goals for treatment and discharge, as well as perceived barriers to progress.    Response: Patient remained attentive and engaged in conversation providing appropriate feedback to fellow group members. Patient responded when prompted and without prompting. Patient expressed that he defines support as unconditional love. The patient has unrealistic expectations of relationships with other people and has moments of insight into the fact. He says that he agrees he can make a connection and he loves hard, but the intensity of his emotions  for another person make him hypervigilant about losing the connection which leads to self sabotage. The patient says that the relaxation exercise helped him clear his mind and recognize where he holds his tension.    Plan:  will continue to encourage patient to explore and verbalize emotions, set goals to aid in preventing re-hospitalization, attend psychotherapeutic group, and follow up with aftercare appointments.

## 2020-01-09 NOTE — PROGRESS NOTES
"[facility]  Adult Nutrition  Progress Note    SUMMARY       Recommendations    Recommendation:   Please Add Boost Plus BID to supplement order to support nutritional intake    Goals: pt to increase meal intake to at least 50% by f/u  Nutrition Goal Status: progressing towards goal  Communication of RD Recs: (sticky note, POC)    Reason for Assessment    Reason For Assessment: RD follow-up  Diagnosis: psychological disorder  Relevant Medical History: HTN, Depression, psych  General Information Comments: Meal intake 25-50% and snack intake is appropriate. NFPE not completed per psych status.  Nutrition Discharge Planning: Regular; low sodium if BP is continuously elevated    Nutrition Risk Screen    Nutrition Risk Screen: no indicators present    Nutrition/Diet History    Spiritual, Cultural Beliefs, [employer] Practices, Values that Affect Care: no  Food Allergies: NKFA  Factors Affecting Nutritional Intake: depression    Anthropometrics    Temp: 96.6 °F (35.9 °C)  Height Method: Stated  Height: 5' 7" (170.2 cm)  Height (inches): 67 in  Weight Method: Standard Scale  Weight: 91.3 kg (201 lb 2.7 oz)  Weight (lb): 201.17 lb  Ideal Body Weight (IBW), Male: 148 lb  % Ideal Body Weight, Male (lb): 136.37 %  BMI (Calculated): 31.5  BMI Grade: 30 - 34.9- obesity - grade I     Lab/Procedures/Meds    Pertinent Labs Reviewed: reviewed  Pertinent Labs Comments: CHOL 254, HDL 36, .2  Pertinent Medications Reviewed: reviewed  Pertinent Medications Comments: folic acid, multivitamin    Estimated/Assessed Needs    Weight Used For Calorie Calculations: 91.2 kg (201 lb)  Energy Calorie Requirements (kcal): 2262  Energy Need Method: [place]-St Jeor(*1.25 AF for obesity)  Protein Requirements: 73 g(.8 g/kg)  Weight Used For Protein Calculations: 91.2 kg (201 lb)     Estimated Fluid Requirement Method: RDA Method  RDA Method (mL): 2262     Nutrition Prescription Ordered    Current Diet Order: " Regular    Evaluation of Received Nutrient/Fluid Intake     % Intake of Estimated Energy Needs: 50 - 75 %  % Meal Intake: 25 - 50 %    Nutrition Risk    Level of Risk/Frequency of Follow-up: (1-2x/wk)     Assessment and Plan  Nutrition Problem:  Inadequate energy intake     Related to (etiology):   psychosis     Signs and Symptoms (as evidenced by):   Meal intake 25-50%     Interventions:  General Healthful Diet     Nutrition Diagnosis Status:   Improving    Monitor and Evaluation    Food and Nutrient Intake: energy intake, food and beverage intake  Food and Nutrient Adminstration: diet order  Physical Activity and Function: nutrition-related ADLs and IADLs  Anthropometric Measurements: weight change  Nutrition-Focused Physical Findings: overall appearance     Malnutrition Assessment  Not enough evidence to support malnutrition dx at this time    Nutrition Follow-Up    RD Follow-up?: Yes

## 2020-01-09 NOTE — PLAN OF CARE
Shift note : patient is eating all meals and is taking all ordered medications . He is in the day room with staff and peers . He is interactive with staff and peers . He is attending all groups to improve his coping skills .

## 2020-01-09 NOTE — PLAN OF CARE
Pt is sleeping at this time and has slept 6 hours with one awakening.  NAD.  Resp even & unlabored.  Pathways clear and bed in low position.  Q 15 minute safety checks ongoing.  All precautions maintained

## 2020-01-10 PROCEDURE — 99233 SBSQ HOSP IP/OBS HIGH 50: CPT | Mod: ,,, | Performed by: PSYCHIATRY & NEUROLOGY

## 2020-01-10 PROCEDURE — 99233 PR SUBSEQUENT HOSPITAL CARE,LEVL III: ICD-10-PCS | Mod: ,,, | Performed by: PSYCHIATRY & NEUROLOGY

## 2020-01-10 PROCEDURE — 90833 PR PSYCHOTHERAPY W/PATIENT W/E&M, 30 MIN (ADD ON): ICD-10-PCS | Mod: ,,, | Performed by: PSYCHIATRY & NEUROLOGY

## 2020-01-10 PROCEDURE — 25000003 PHARM REV CODE 250: Performed by: PSYCHIATRY & NEUROLOGY

## 2020-01-10 PROCEDURE — 90833 PSYTX W PT W E/M 30 MIN: CPT | Mod: ,,, | Performed by: PSYCHIATRY & NEUROLOGY

## 2020-01-10 PROCEDURE — 11400000 HC PSYCH PRIVATE ROOM

## 2020-01-10 RX ORDER — GABAPENTIN 300 MG/1
600 CAPSULE ORAL 2 TIMES DAILY
Status: DISCONTINUED | OUTPATIENT
Start: 2020-01-10 | End: 2020-01-12 | Stop reason: HOSPADM

## 2020-01-10 RX ADMIN — BUPROPION HYDROCHLORIDE 450 MG: 150 TABLET, EXTENDED RELEASE ORAL at 08:01

## 2020-01-10 RX ADMIN — FOLIC ACID 1 MG: 1 TABLET ORAL at 08:01

## 2020-01-10 RX ADMIN — CARVEDILOL 25 MG: 12.5 TABLET, FILM COATED ORAL at 08:01

## 2020-01-10 RX ADMIN — LOSARTAN POTASSIUM 50 MG: 50 TABLET, FILM COATED ORAL at 08:01

## 2020-01-10 RX ADMIN — GABAPENTIN 600 MG: 300 CAPSULE ORAL at 08:01

## 2020-01-10 RX ADMIN — THERA TABS 1 TABLET: TAB at 08:01

## 2020-01-10 RX ADMIN — GABAPENTIN 500 MG: 100 CAPSULE ORAL at 08:01

## 2020-01-10 RX ADMIN — SERTRALINE HYDROCHLORIDE 200 MG: 100 TABLET ORAL at 08:01
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
## 2020-01-10 NOTE — PLAN OF CARE
Pt calm and cooperative, out on unit interacting good with staff and peers, appetite good, med compliant, denies SI at this time, safety precautions maintained, will continue to monitor

## 2020-01-10 NOTE — PROGRESS NOTES
01/10/20 1040   [facility] Group Therapy   Group Name Leisure Skills Training   Specific Interventions Cognitive Stimulation Training   Participation Level Active;Supportive;Appropriate;Attentive   Participation Quality Cooperative;Social   Insight/Motivation Applies New Skills;Good   Affect/Mood Display Appropriate   Cognition Alert   Psychomotor WNL   Patient pleasant , motivated and states he feels hopeful about his future.

## 2020-01-10 NOTE — PLAN OF CARE
"  Problem: Adult Behavioral Health Plan of Care  Goal: Optimized Coping Skills in Response to Life Stressors  Intervention: Promote Effective Coping Strategies  Flowsheets (Taken 1/10/2020 3205)  Supportive Measures: active listening utilized; positive reinforcement provided; verbalization of feelings encouraged; counseling provided; problem solving facilitated; decision-making supported; relaxation techniques promoted; goal setting facilitated; self-care encouraged; guided imagery facilitated; self-reflection promoted; journaling promoted; self-responsibility promoted       Behavior: Patient attended psychotherapeutic group dressed in hospital scrubs, appropriate grooming with congruent affect and "good" mood, attentive posture, and appropriate eye contact. Patient remained engaged and attentive.    Intervention:  will engage patients in a CBT based activity about chaos versus calm to encourage internal locus of control and gain perspective. Patient's will also be engaged in a guided relaxation exercise. Patients will be given time to express thoughts, concerns and goals for treatment and discharge, as well as perceived barriers to progress.    Response: Patient remained attentive and engaged in conversation providing appropriate feedback to fellow group members. Patient responded when prompted and without prompting. Patient expressed feeling calm in chaos as that is his normal and fears the good times ending. Discussed patient's plans and main stressors. Helped patient problem solve making a phone call to his ex and creating scenarios in which that would not be conducive to his healing.     Plan:  will continue to encourage patient to explore and verbalize emotions, set goals to aid in preventing re-hospitalization, attend psychotherapeutic group, and follow up with aftercare appointments.    "

## 2020-01-10 NOTE — PLAN OF CARE
Problem: Adult Behavioral Health Plan of Care  Goal: Develops/Participates in Therapeutic [place] to Support Successful Transition  Intervention: Foster Therapeutic [place]  Flowsheets (Taken 1/10/2020 1322)  Trust Relationship/Rapport: care explained; choices provided; thoughts/feelings acknowledged; emotional support provided; empathic listening provided; questions answered; questions encouraged; reassurance provided      met with patient individually to create a safety plan and talk about making his environment safe.  confirmed that his friend Todd will be picking him up and ensuring that all remaining pills/medications that the patient is not currently on, are thrown away. Todd will be staying with the patient for the next 3 days. We discussed safety planning with medications and came up with a plan to put half a weeks worth of medications in a pill box, the remaining in a lock box, and the key on the other side of the house to reduce access to means by ensuring it will take more effort to take more than prescribed. The patient says that he feels good about returning home and believes he will be safe. Also discussed allowing time for his friend Rudy to call him back and take time to herself, rather than overwhelming her by continuously calling.

## 2020-01-10 NOTE — PLAN OF CARE
Pt is sleeping at this time and has slept 7 hours uninterrupted.  NAD.  Resp even & unlabored.  Pathways clear and bed in low position.  Q 15 minute safety checks ongoing.  All precautions maintained

## 2020-01-10 NOTE — PLAN OF CARE
Shift note : patient is eating all meals and is taking all ordered medications . He is cooperative . And attends all groups to improve his coping skills . He is interactive with staff and peers . Bright affect .

## 2020-01-11 PROCEDURE — 11400000 HC PSYCH PRIVATE ROOM

## 2020-01-11 PROCEDURE — 99233 SBSQ HOSP IP/OBS HIGH 50: CPT | Mod: ,,, | Performed by: PSYCHIATRY & NEUROLOGY

## 2020-01-11 PROCEDURE — 25000003 PHARM REV CODE 250: Performed by: PSYCHIATRY & NEUROLOGY

## 2020-01-11 PROCEDURE — 90833 PSYTX W PT W E/M 30 MIN: CPT | Mod: ,,, | Performed by: PSYCHIATRY & NEUROLOGY

## 2020-01-11 PROCEDURE — 90833 PR PSYCHOTHERAPY W/PATIENT W/E&M, 30 MIN (ADD ON): ICD-10-PCS | Mod: ,,, | Performed by: PSYCHIATRY & NEUROLOGY

## 2020-01-11 PROCEDURE — 99233 PR SUBSEQUENT HOSPITAL CARE,LEVL III: ICD-10-PCS | Mod: ,,, | Performed by: PSYCHIATRY & NEUROLOGY

## 2020-01-11 RX ORDER — GABAPENTIN 300 MG/1
600 CAPSULE ORAL 2 TIMES DAILY
Qty: 120 CAPSULE | Refills: 1 | Status: SHIPPED | OUTPATIENT
Start: 2020-01-11 | End: 2020-02-18 | Stop reason: SDUPTHER

## 2020-01-11 RX ADMIN — FOLIC ACID 1 MG: 1 TABLET ORAL at 08:01

## 2020-01-11 RX ADMIN — BUPROPION HYDROCHLORIDE 450 MG: 150 TABLET, EXTENDED RELEASE ORAL at 08:01

## 2020-01-11 RX ADMIN — GABAPENTIN 600 MG: 300 CAPSULE ORAL at 08:01

## 2020-01-11 RX ADMIN — THERA TABS 1 TABLET: TAB at 08:01

## 2020-01-11 RX ADMIN — CARVEDILOL 25 MG: 12.5 TABLET, FILM COATED ORAL at 08:01

## 2020-01-11 RX ADMIN — SERTRALINE HYDROCHLORIDE 200 MG: 100 TABLET ORAL at 08:01

## 2020-01-11 RX ADMIN — LOSARTAN POTASSIUM 50 MG: 50 TABLET, FILM COATED ORAL at 08:01

## 2020-01-11 NOTE — PLAN OF CARE
Pt calm and cooperative, pt's therapist from [facility] came visit and made pt very happy that she was so concerned about him, pt Denies SI at this time, med compliant, appetite good, out on unit interacting good with staff and peers
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
## 2020-01-11 NOTE — PLAN OF CARE
Plan of care reviewed with patient, patient in agreement with plan. Denies ideations to hurt himself or others. Visible in milieu, mood much improved, interacts well with staff and peers. Calm and cooperative. Gait steady, no falls. Pathways and environment clear and clutter-free. Compliant with medication. Continues to twirl hair on top of scalp. Promoted an individualized safety plan, effective coping skills, improved sleep patterns, improved social interactions, methods for relaxation, mood improvement, and reducing stress and anxiety.  Will continue to monitor for safety.

## 2020-01-12 VITALS
HEIGHT: 67 IN | WEIGHT: 200.19 LBS | TEMPERATURE: 97 F | DIASTOLIC BLOOD PRESSURE: 75 MMHG | RESPIRATION RATE: 18 BRPM | HEART RATE: 79 BPM | SYSTOLIC BLOOD PRESSURE: 116 MMHG | BODY MASS INDEX: 31.42 KG/M2

## 2020-01-12 PROCEDURE — 99239 PR HOSPITAL DISCHARGE DAY,>30 MIN: ICD-10-PCS | Mod: ,,, | Performed by: PSYCHIATRY & NEUROLOGY

## 2020-01-12 PROCEDURE — 99239 HOSP IP/OBS DSCHRG MGMT >30: CPT | Mod: ,,, | Performed by: PSYCHIATRY & NEUROLOGY

## 2020-01-12 PROCEDURE — 25000003 PHARM REV CODE 250: Performed by: PSYCHIATRY & NEUROLOGY

## 2020-01-12 RX ADMIN — LOSARTAN POTASSIUM 50 MG: 50 TABLET, FILM COATED ORAL at 08:01

## 2020-01-12 RX ADMIN — FOLIC ACID 1 MG: 1 TABLET ORAL at 08:01

## 2020-01-12 RX ADMIN — BUPROPION HYDROCHLORIDE 450 MG: 150 TABLET, EXTENDED RELEASE ORAL at 08:01

## 2020-01-12 RX ADMIN — SERTRALINE HYDROCHLORIDE 200 MG: 100 TABLET ORAL at 08:01

## 2020-01-12 RX ADMIN — CARVEDILOL 25 MG: 12.5 TABLET, FILM COATED ORAL at 08:01

## 2020-01-12 RX ADMIN — GABAPENTIN 600 MG: 300 CAPSULE ORAL at 08:01

## 2020-01-12 RX ADMIN — THERA TABS 1 TABLET: TAB at 08:01

## 2020-01-12 NOTE — PLAN OF CARE
Pt has slept 7.5 hours so far.  NAD.  Resp even and unlabored.  Pathways clear and bed is low.  Q 15 minute safety checks ongoing.  All precautions maintained.
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
## 2020-01-12 NOTE — PSYCH
Order for discharge received.Discharge instructions explained to pt and voiced a understanding.Calm,cooperative,no si/hi,and stable.Pt will be following up with Angelo Ramsay MD,[facility] [place], [place].[address],telephone [phone].Appointment is on 1-13-20 at 400pm.Pt does not use tobacco products.Pt will receive a substance abuse therapy appointment at mentioned appointment due to a positive UDS.AVS not faxed due to pt follow up has access to Epic.Pt will provide ride home.

## 2020-01-13 ENCOUNTER — OFFICE VISIT (OUTPATIENT)
Dept: PSYCHIATRY | Facility: CLINIC | Age: 35
End: 2020-01-13
Payer: COMMERCIAL

## 2020-01-13 VITALS
DIASTOLIC BLOOD PRESSURE: 83 MMHG | SYSTOLIC BLOOD PRESSURE: 118 MMHG | WEIGHT: 207.88 LBS | BODY MASS INDEX: 32.63 KG/M2 | HEART RATE: 102 BPM | HEIGHT: 67 IN

## 2020-01-13 DIAGNOSIS — F33.40 MDD (RECURRENT MAJOR DEPRESSIVE DISORDER) IN REMISSION: ICD-10-CM

## 2020-01-13 DIAGNOSIS — F41.1 GAD (GENERALIZED ANXIETY DISORDER): ICD-10-CM

## 2020-01-13 DIAGNOSIS — F33.2 MDD (MAJOR DEPRESSIVE DISORDER), RECURRENT SEVERE, WITHOUT PSYCHOSIS: Primary | ICD-10-CM

## 2020-01-13 PROCEDURE — 99214 OFFICE O/P EST MOD 30 MIN: CPT | Mod: S$GLB,,, | Performed by: PSYCHIATRY & NEUROLOGY

## 2020-01-13 PROCEDURE — 99214 PR OFFICE/OUTPT VISIT, EST, LEVL IV, 30-39 MIN: ICD-10-PCS | Mod: S$GLB,,, | Performed by: PSYCHIATRY & NEUROLOGY

## 2020-01-13 PROCEDURE — 90833 PR PSYCHOTHERAPY W/PATIENT W/E&M, 30 MIN (ADD ON): ICD-10-PCS | Mod: S$GLB,,, | Performed by: PSYCHIATRY & NEUROLOGY

## 2020-01-13 PROCEDURE — 99999 PR PBB SHADOW E&M-EST. PATIENT-LVL III: ICD-10-PCS | Mod: PBBFAC,,, | Performed by: PSYCHIATRY & NEUROLOGY

## 2020-01-13 PROCEDURE — 90833 PSYTX W PT W E/M 30 MIN: CPT | Mod: S$GLB,,, | Performed by: PSYCHIATRY & NEUROLOGY

## 2020-01-13 PROCEDURE — 99999 PR PBB SHADOW E&M-EST. PATIENT-LVL III: CPT | Mod: PBBFAC,,, | Performed by: PSYCHIATRY & NEUROLOGY

## 2020-01-13 NOTE — PROGRESS NOTES
Outpatient Psychiatry Follow-Up Visit (MD/NP)    1/13/2020    Clinical Status of Patient:  Outpatient (Ambulatory)    Chief Complaint:  Schuyler Calles Jr. is a 34 y.o. male who presents today for follow-up of depression.  Met with patient.      Interval History and Content of Current Session:   Patient was admitted to [facility] for 6 days after making a suicide attempt by overdose on clonazepam.  Precipitating events included the death of his aunt, a relationship break-up, and excepting a a new position at work which was very stressful.  While at [facility] gabapentin was increased to 600 mg twice daily.  Zoloft and Wellbutrin were continued.  Today the patient reports that he will not make a suicide attempt.  However he is uncertain as to whether not he simply wants to die.  He is looking forward to his appointment with his therapist, Cassandra tomorrow at noon.  He has enlisted the help of a friend to stay with him.  He has not been sleeping well, in the hospital of course, but also last night at home.  He is self-critical and disappointed with himself he is still unable to handle life.      Current medications:  Zoloft to 200 mg  Wellbutrin 450 mg  Gabapentin 200 mg tid      Prior trials:  prozac - took it for 2-3 yrs before it started to work less well  seroquel - helped for a while, no side effects recalled.  Retrial caused weight gain.    cymbalta - inadequate at greater than 60 mg  abilify - did not help  Buspirone - fogginess  effexor - does not recall  lexapro- became ineffective      Psychotherapy:  · Target symptoms: depression  · Why chosen therapy is appropriate versus another modality: relevant to diagnosis, evidence based practice  · Outcome monitoring methods: self-report, observation  · Therapeutic intervention type: supportive psychotherapy  · Topics discussed/themes: relationships difficulties, work stress, illness/death of a loved one, building skills sets for symptom management, symptom  "recognition  · The patient's response to the intervention is motivated. The patient's progress toward treatment goals is poor.   · Duration of intervention: 25 minutes.    Review of Systems   · PSYCHIATRIC: Pertinant items are noted in the narrative.    Past Medical, Family and Social History: The patient's past medical, family and social history have been reviewed and updated as appropriate within the electronic medical record - see encounter notes.    Compliance: yes    Side effects: None    Risk Parameters:  Patient reports no suicidal ideation  Patient reports no homicidal ideation  Patient reports no self-injurious behavior  Patient reports no violent behavior    Exam (detailed: at least 9 elements; comprehensive: all 15 elements)   Constitutional  Vitals:  Most recent vital signs, dated less than 90 days prior to this appointment, were reviewed.   Vitals:    01/13/20 1551   BP: 118/83   Pulse: 102   Weight: 94.3 kg (207 lb 14.3 oz)   Height: 5' 7" (1.702 m)        General:  age appropriate, casually dressed, neatly groomed     Musculoskeletal  Muscle Strength/Tone:  no dyskinesia, no tremor   Gait & Station:  non-ataxic     Psychiatric  Speech:  not pressured, rapid or loud, but clearly audible, no articulation errors, studdering or slurring   Mood:    Affect:  euthymic  appropriate, mood-congruent, midline   Thought Process:  goal-directed, logical   Associations:  intact   Thought Content:  normal, no suicidality, no homicidality, delusions, or paranoia   Insight:  has awareness of illness   Judgement: behavior is adequate to circumstances   Orientation:  grossly intact   Memory: intact for content of interview   Language: grossly intact   Attention Span & Concentration:  able to focus   Fund of Knowledge:  intact and appropriate to age and level of education     Assessment and Diagnosis   Status/Progress: Based on the examination today, the patient's problem(s) is/are adequately but not ideally controlled.  " New problems have not been presented today.   Co-morbidities are complicating management of the primary condition.  There are no active rule-out diagnoses for this patient at this time.     General Impression: Pt with a h/o depression and prior treatment that included some augmentation with varying success.     Diagnosis:  Major Depressive Disorder recurrent in rem  GOLDIE      Intervention/Counseling/Treatment Plan   · Continue gabapentin 600 mg twice daily  · Continue Zoloft 200 mg daily  · Continue Wellbutrin 450mg daily  · Continue psychotherapy with Ms. Stark  · TAL paperwork completed        Return to Clinic:  3 months

## 2020-01-14 ENCOUNTER — PATIENT MESSAGE (OUTPATIENT)
Dept: PSYCHIATRY | Facility: CLINIC | Age: 35
End: 2020-01-14

## 2020-02-03 ENCOUNTER — PATIENT MESSAGE (OUTPATIENT)
Dept: PSYCHIATRY | Facility: CLINIC | Age: 35
End: 2020-02-03

## 2020-02-03 RX ORDER — SERTRALINE HYDROCHLORIDE 100 MG/1
TABLET, FILM COATED ORAL
Qty: 180 TABLET | Refills: 0 | Status: SHIPPED | OUTPATIENT
Start: 2020-02-03 | End: 2020-04-15 | Stop reason: SDUPTHER

## 2020-02-18 ENCOUNTER — OFFICE VISIT (OUTPATIENT)
Dept: PSYCHIATRY | Facility: CLINIC | Age: 35
End: 2020-02-18
Payer: COMMERCIAL

## 2020-02-18 VITALS
WEIGHT: 209.56 LBS | HEART RATE: 87 BPM | BODY MASS INDEX: 32.82 KG/M2 | DIASTOLIC BLOOD PRESSURE: 80 MMHG | SYSTOLIC BLOOD PRESSURE: 131 MMHG

## 2020-02-18 DIAGNOSIS — F41.1 GAD (GENERALIZED ANXIETY DISORDER): Primary | ICD-10-CM

## 2020-02-18 DIAGNOSIS — F33.40 MDD (RECURRENT MAJOR DEPRESSIVE DISORDER) IN REMISSION: ICD-10-CM

## 2020-02-18 PROCEDURE — 90833 PR PSYCHOTHERAPY W/PATIENT W/E&M, 30 MIN (ADD ON): ICD-10-PCS | Mod: S$GLB,,, | Performed by: PSYCHIATRY & NEUROLOGY

## 2020-02-18 PROCEDURE — 99214 OFFICE O/P EST MOD 30 MIN: CPT | Mod: S$GLB,,, | Performed by: PSYCHIATRY & NEUROLOGY

## 2020-02-18 PROCEDURE — 99214 PR OFFICE/OUTPT VISIT, EST, LEVL IV, 30-39 MIN: ICD-10-PCS | Mod: S$GLB,,, | Performed by: PSYCHIATRY & NEUROLOGY

## 2020-02-18 PROCEDURE — 99999 PR PBB SHADOW E&M-EST. PATIENT-LVL III: CPT | Mod: PBBFAC,,, | Performed by: PSYCHIATRY & NEUROLOGY

## 2020-02-18 PROCEDURE — 90833 PSYTX W PT W E/M 30 MIN: CPT | Mod: S$GLB,,, | Performed by: PSYCHIATRY & NEUROLOGY

## 2020-02-18 PROCEDURE — 99999 PR PBB SHADOW E&M-EST. PATIENT-LVL III: ICD-10-PCS | Mod: PBBFAC,,, | Performed by: PSYCHIATRY & NEUROLOGY

## 2020-02-18 RX ORDER — GABAPENTIN 300 MG/1
600 CAPSULE ORAL 2 TIMES DAILY
Qty: 360 CAPSULE | Refills: 0 | Status: SHIPPED | OUTPATIENT
Start: 2020-02-18 | End: 2020-02-18 | Stop reason: SDUPTHER

## 2020-02-18 RX ORDER — GABAPENTIN 300 MG/1
600 CAPSULE ORAL 2 TIMES DAILY
Qty: 360 CAPSULE | Refills: 0 | Status: SHIPPED | OUTPATIENT
Start: 2020-02-18 | End: 2020-03-12

## 2020-02-18 RX ORDER — CLONAZEPAM 0.5 MG/1
0.5 TABLET ORAL DAILY PRN
Qty: 20 TABLET | Refills: 0 | Status: SHIPPED | OUTPATIENT
Start: 2020-02-18 | End: 2020-03-12 | Stop reason: SDUPTHER

## 2020-02-18 RX ORDER — BUPROPION HYDROCHLORIDE 150 MG/1
TABLET ORAL
Qty: 270 TABLET | Refills: 0 | Status: SHIPPED | OUTPATIENT
Start: 2020-02-18 | End: 2020-04-15 | Stop reason: SDUPTHER

## 2020-02-18 NOTE — PROGRESS NOTES
"Outpatient Psychiatry Follow-Up Visit (MD/NP)    2/18/2020    Clinical Status of Patient:  Outpatient (Ambulatory)    Chief Complaint:  Schuyler Calles Jr. is a 34 y.o. male who presents today for follow-up of depression.  Met with patient.      Interval History and Content of Current Session:   Some anxiety about returning to work and how he will be treated by supportive coworkers.  He knows he is able to do the work but is still anxious.  He realizes he has to redefine his relationship with work and not let it take over his life and not be as much of a people pleaser.  He has been reading books (self help), watching TV.  He reports he has been eating "really badly".  He gets emotional when reading but no crying spells.  Sleeping 5-6 hrs nightly.   Has had no desiring death.  Better able to control his emotions. Energy for the last 2 days has been good.  Has become overwhelmed by all the things he has to do and does nothing.  Drank 2 glasses of wine and was able to get things done.      Attending thrapy twice a week.        Current medications:  Zoloft to 200 mg  Wellbutrin 450 mg  Gabapentin 200 mg tid      Prior trials:  prozac - took it for 2-3 yrs before it started to work less well  seroquel - helped for a while, no side effects recalled.  Retrial caused weight gain.    cymbalta - inadequate at greater than 60 mg  abilify - did not help  Buspirone - fogginess  effexor - does not recall  lexapro- became ineffective      Psychotherapy:  · Target symptoms: depression  · Why chosen therapy is appropriate versus another modality: relevant to diagnosis, evidence based practice  · Outcome monitoring methods: self-report, observation  · Therapeutic intervention type: supportive psychotherapy  · Topics discussed/themes: work stress, illness/death of a loved one, building skills sets for symptom management, symptom recognition  · The patient's response to the intervention is motivated. The patient's progress toward " treatment goals is poor.   · Duration of intervention: 16 minutes.    Review of Systems   · PSYCHIATRIC: Pertinant items are noted in the narrative.  · CONSTITUTIONAL: Positive for weight gain.   · RESPIRATORY: No shortness of breath.  · CARDIOVASCULAR: No tachycardia or chest pain.  · GASTROINTESTINAL: No nausea, vomiting, pain, constipation or diarrhea.    Past Medical, Family and Social History: The patient's past medical, family and social history have been reviewed and updated as appropriate within the electronic medical record - see encounter notes.    Compliance: yes    Side effects: None    Risk Parameters:  Patient reports no suicidal ideation  Patient reports no homicidal ideation  Patient reports no self-injurious behavior  Patient reports no violent behavior    Exam (detailed: at least 9 elements; comprehensive: all 15 elements)   Constitutional  Vitals:  Most recent vital signs, dated less than 90 days prior to this appointment, were reviewed.   Vitals:    02/18/20 1059   BP: 131/80   Pulse: 87   Weight: 95.1 kg (209 lb 8.8 oz)        General:  age appropriate, casually dressed, neatly groomed     Musculoskeletal  Muscle Strength/Tone:  no dyskinesia, no tremor   Gait & Station:  non-ataxic     Psychiatric  Speech:  not pressured, rapid or loud, but clearly audible, no articulation errors, studdering or slurring   Mood:    Affect:  euthymic  appropriate, mood-congruent, midline   Thought Process:  goal-directed, logical   Associations:  intact   Thought Content:  normal, no suicidality, no homicidality, delusions, or paranoia   Insight:  has awareness of illness   Judgement: behavior is adequate to circumstances   Orientation:  grossly intact   Memory: intact for content of interview   Language: grossly intact   Attention Span & Concentration:  able to focus   Fund of Knowledge:  intact and appropriate to age and level of education     Assessment and Diagnosis   Status/Progress: Based on the examination  today, the patient's problem(s) is/are adequately but not ideally controlled.  New problems have not been presented today.   Co-morbidities are complicating management of the primary condition.  There are no active rule-out diagnoses for this patient at this time.     General Impression: Pt with a h/o depression and prior treatment that included some augmentation with varying success.     Diagnosis:  Major Depressive Disorder recurrent in rem  GOLDIE      Intervention/Counseling/Treatment Plan   · Continue gabapentin 600 mg twice daily  · Continue Zoloft 200 mg daily  · Continue Wellbutrin 450mg daily  · Continue psychotherapy with Ms. Stark  ·  return to work in 2 days        Return to Clinic:  1 month

## 2020-03-12 ENCOUNTER — OFFICE VISIT (OUTPATIENT)
Dept: PSYCHIATRY | Facility: CLINIC | Age: 35
End: 2020-03-12
Payer: COMMERCIAL

## 2020-03-12 VITALS
BODY MASS INDEX: 33.58 KG/M2 | SYSTOLIC BLOOD PRESSURE: 147 MMHG | HEIGHT: 67 IN | HEART RATE: 75 BPM | WEIGHT: 213.94 LBS | DIASTOLIC BLOOD PRESSURE: 98 MMHG

## 2020-03-12 DIAGNOSIS — F41.1 GAD (GENERALIZED ANXIETY DISORDER): Primary | ICD-10-CM

## 2020-03-12 DIAGNOSIS — F33.0 MDD (MAJOR DEPRESSIVE DISORDER), RECURRENT EPISODE, MILD: ICD-10-CM

## 2020-03-12 PROCEDURE — 99213 PR OFFICE/OUTPT VISIT, EST, LEVL III, 20-29 MIN: ICD-10-PCS | Mod: S$GLB,,, | Performed by: PSYCHIATRY & NEUROLOGY

## 2020-03-12 PROCEDURE — 90833 PR PSYCHOTHERAPY W/PATIENT W/E&M, 30 MIN (ADD ON): ICD-10-PCS | Mod: S$GLB,,, | Performed by: PSYCHIATRY & NEUROLOGY

## 2020-03-12 PROCEDURE — 99999 PR PBB SHADOW E&M-EST. PATIENT-LVL III: ICD-10-PCS | Mod: PBBFAC,,, | Performed by: PSYCHIATRY & NEUROLOGY

## 2020-03-12 PROCEDURE — 99213 OFFICE O/P EST LOW 20 MIN: CPT | Mod: S$GLB,,, | Performed by: PSYCHIATRY & NEUROLOGY

## 2020-03-12 PROCEDURE — 90833 PSYTX W PT W E/M 30 MIN: CPT | Mod: S$GLB,,, | Performed by: PSYCHIATRY & NEUROLOGY

## 2020-03-12 PROCEDURE — 99999 PR PBB SHADOW E&M-EST. PATIENT-LVL III: CPT | Mod: PBBFAC,,, | Performed by: PSYCHIATRY & NEUROLOGY

## 2020-03-12 RX ORDER — GABAPENTIN 300 MG/1
600 CAPSULE ORAL 3 TIMES DAILY
Qty: 540 CAPSULE | Refills: 0 | Status: SHIPPED | OUTPATIENT
Start: 2020-03-12 | End: 2020-04-15 | Stop reason: SDUPTHER

## 2020-03-12 RX ORDER — CLONAZEPAM 0.5 MG/1
0.5 TABLET ORAL DAILY PRN
Qty: 20 TABLET | Refills: 0 | Status: SHIPPED | OUTPATIENT
Start: 2020-03-12 | End: 2020-04-15 | Stop reason: SDUPTHER

## 2020-03-12 NOTE — PROGRESS NOTES
"Outpatient Psychiatry Follow-Up Visit (MD/NP)    3/12/2020    Clinical Status of Patient:  Outpatient (Ambulatory)    Chief Complaint:  Schuyler Calles Jr. is a 34 y.o. male who presents today for follow-up of depression.  Met with patient.      Interval History and Content of Current Session:   I'm struggling.  Starting to feel like he is not going to get better despite doing all the things he is supposed to do.  Anxiety is uncontrolled.  Feels overwhelmed by work.  Gets spurts of energy when he feels someone has validated him.  Drinks 3-4 glasses of wine and feels thinks arent so bad.  A bottle 2-3 times a week.  Endorses hopelessness.  I'm getting older and things don't seem to be getting better."  May be eating better.  Sleeping poorly due to dreams about work where he does something wrong.  Not exercising consistently.  Is enjoying very little.    Attending thrapy twice a week.        Current medications:  Zoloft to 200 mg  Wellbutrin 450 mg  Gabapentin 200 mg tid      Prior trials:  prozac - took it for 2-3 yrs before it started to work less well  seroquel - helped for a while, no side effects recalled.  Retrial caused weight gain.    cymbalta - inadequate at greater than 60 mg  abilify - did not help  Buspirone - fogginess  effexor - does not recall  lexapro- became ineffective      Psychotherapy:  · Target symptoms: depression  · Why chosen therapy is appropriate versus another modality: relevant to diagnosis, evidence based practice  · Outcome monitoring methods: self-report, observation  · Therapeutic intervention type: supportive psychotherapy  · Topics discussed/themes: work stress, illness/death of a loved one, building skills sets for symptom management, symptom recognition  · The patient's response to the intervention is motivated. The patient's progress toward treatment goals is poor.   · Duration of intervention: 16 minutes.    Review of Systems   · PSYCHIATRIC: Pertinant items are noted in " the narrative.  · CONSTITUTIONAL: No weight gain or loss.   · RESPIRATORY: No shortness of breath.  · CARDIOVASCULAR: No tachycardia or chest pain.  · GASTROINTESTINAL: No nausea, vomiting, pain, constipation or diarrhea.    Past Medical, Family and Social History: The patient's past medical, family and social history have been reviewed and updated as appropriate within the electronic medical record - see encounter notes.    Compliance: yes    Side effects: None    Risk Parameters:  Patient reports no suicidal ideation  Patient reports no homicidal ideation  Patient reports no self-injurious behavior  Patient reports no violent behavior    Exam (detailed: at least 9 elements; comprehensive: all 15 elements)   Constitutional  Vitals:  Most recent vital signs, dated less than 90 days prior to this appointment, were reviewed.   There were no vitals filed for this visit.     General:  age appropriate, casually dressed, neatly groomed     Musculoskeletal  Muscle Strength/Tone:  no dyskinesia, no tremor   Gait & Station:  non-ataxic     Psychiatric  Speech:  not pressured, rapid or loud, but clearly audible, no articulation errors, studdering or slurring   Mood:    Affect:  euthymic  appropriate, mood-congruent, midline   Thought Process:  goal-directed, logical   Associations:  intact   Thought Content:  normal, no suicidality, no homicidality, delusions, or paranoia   Insight:  has awareness of illness   Judgement: behavior is adequate to circumstances   Orientation:  grossly intact   Memory: intact for content of interview   Language: grossly intact   Attention Span & Concentration:  able to focus   Fund of Knowledge:  intact and appropriate to age and level of education     Assessment and Diagnosis   Status/Progress: Based on the examination today, the patient's problem(s) is/are adequately but not ideally controlled.  New problems have not been presented today.   Co-morbidities are complicating management of the  primary condition.  There are no active rule-out diagnoses for this patient at this time.     General Impression: Pt with a h/o depression and prior treatment that included some augmentation with varying success.     Diagnosis:  Major Depressive Disorder recurrent mild  GOLDIE      Intervention/Counseling/Treatment Plan   · Increase gabapentin to 900 mg twice daily  · Continue Zoloft 200 mg daily  · Continue Wellbutrin 450mg daily  · Continue psychotherapy with Ms. Stark  · Instructed patient to decrease alcohol intake drastically if not discontinue completely.  He was not very receptive to this recommendation.          Return to Clinic:  1 month

## 2020-04-15 ENCOUNTER — OFFICE VISIT (OUTPATIENT)
Dept: PSYCHIATRY | Facility: CLINIC | Age: 35
End: 2020-04-15
Payer: COMMERCIAL

## 2020-04-15 DIAGNOSIS — F33.40 MDD (RECURRENT MAJOR DEPRESSIVE DISORDER) IN REMISSION: ICD-10-CM

## 2020-04-15 DIAGNOSIS — F41.1 GAD (GENERALIZED ANXIETY DISORDER): ICD-10-CM

## 2020-04-15 DIAGNOSIS — F33.0 MDD (MAJOR DEPRESSIVE DISORDER), RECURRENT EPISODE, MILD: Primary | ICD-10-CM

## 2020-04-15 PROCEDURE — 99214 OFFICE O/P EST MOD 30 MIN: CPT | Mod: 95,,, | Performed by: PSYCHIATRY & NEUROLOGY

## 2020-04-15 PROCEDURE — 90833 PSYTX W PT W E/M 30 MIN: CPT | Mod: 95,,, | Performed by: PSYCHIATRY & NEUROLOGY

## 2020-04-15 PROCEDURE — 90833 PR PSYCHOTHERAPY W/PATIENT W/E&M, 30 MIN (ADD ON): ICD-10-PCS | Mod: 95,,, | Performed by: PSYCHIATRY & NEUROLOGY

## 2020-04-15 PROCEDURE — 99214 PR OFFICE/OUTPT VISIT, EST, LEVL IV, 30-39 MIN: ICD-10-PCS | Mod: 95,,, | Performed by: PSYCHIATRY & NEUROLOGY

## 2020-04-15 RX ORDER — CLONAZEPAM 0.5 MG/1
0.5 TABLET ORAL DAILY PRN
Qty: 45 TABLET | Refills: 1 | Status: SHIPPED | OUTPATIENT
Start: 2020-04-15 | End: 2020-10-01

## 2020-04-15 RX ORDER — GABAPENTIN 300 MG/1
600 CAPSULE ORAL 3 TIMES DAILY
Qty: 540 CAPSULE | Refills: 0 | Status: SHIPPED | OUTPATIENT
Start: 2020-04-15 | End: 2021-01-27

## 2020-04-15 RX ORDER — SERTRALINE HYDROCHLORIDE 100 MG/1
TABLET, FILM COATED ORAL
Qty: 180 TABLET | Refills: 0 | Status: SHIPPED | OUTPATIENT
Start: 2020-04-15 | End: 2020-12-01 | Stop reason: SDUPTHER

## 2020-04-15 RX ORDER — BUPROPION HYDROCHLORIDE 150 MG/1
TABLET ORAL
Qty: 270 TABLET | Refills: 0 | Status: SHIPPED | OUTPATIENT
Start: 2020-04-15 | End: 2020-09-08

## 2020-04-15 NOTE — PROGRESS NOTES
The patient location is:  home  The chief complaint leading to consultation is:  Depression and anxiety  Visit type: audiovisual  Total time spent with patient:  35 min  Each patient to whom he or she provides medical services by telemedicine is:  (1) informed of the relationship between the physician and patient and the respective role of any other health care provider with respect to management of the patient; and (2) notified that he or she may decline to receive medical services by telemedicine and may withdraw from such care at any time.    Notes:     Outpatient Psychiatry Follow-Up Visit (MD/NP)    4/15/2020    Clinical Status of Patient:  Outpatient (Ambulatory)    Chief Complaint:  Schuyler Calles Jr. is a 34 y.o. male who presents today for follow-up of depression and anxiety.  Met with patient.      Interval History and Content of Current Session:    The patient reports he has not been doing very well.  Being socially isolated has made his depression anxiety worse.  Work has been very stressful.  He is working from home and the only contact he has is with people from work.  He is still learning about his new position and it is difficult to do remotely.  He believes he is getting better at it however.    He admits to continued episodes of feeling that he does not want to be here.  He denies any thoughts of acting on this.  He states nobody would realize if he were no longer around.  He believes he has gained 7 lb since having to stay at home due to the pandemic.  He is eating too much.  He is trying not to watch the news.  He is generally going to 3 days without drinking.  He reports drinking 2 drinks after work at times which allows his anxiety to improve and get some chores done.  He reports he has not showered in days.  He is doing little that he enjoys but he is doing some reading.  He plans to go for a jog after work today.  He admits to feeling hopeless at times and discontinuing medication for  2-3 days at a time.  He has done this twice since our last appointment.    He reports that he feels much better today than he did previously.  Yesterday he had a bad headache.  He took a headache remedy made for nighttime (possibly Excedrin p.m.) and 3 clonazepam.  He slept very well for about 11 hr and awakened this morning feeling much better.  Today has gone very well.  He is not nearly as anxious.    The patient reports that increasing gabapentin has not helped.  He does not believe the medication is helping at all.  We discussed eventually discontinuing this medication.      Current medications:  Zoloft to 200 mg  Wellbutrin 450 mg  Gabapentin 200 mg tid      Prior trials:  prozac - took it for 2-3 yrs before it started to work less well  seroquel - helped for a while, no side effects recalled.  Retrial caused weight gain.    cymbalta - inadequate at greater than 60 mg  abilify - did not help  Buspirone - fogginess  effexor - does not recall  lexapro- became ineffective      Psychotherapy:  · Target symptoms: depression  · Why chosen therapy is appropriate versus another modality: relevant to diagnosis, evidence based practice  · Outcome monitoring methods: self-report, observation  · Therapeutic intervention type: supportive psychotherapy  · Topics discussed/themes: work stress, building skills sets for symptom management, symptom recognition  · The patient's response to the intervention is motivated. The patient's progress toward treatment goals is poor.   · Duration of intervention: 18 minutes.    Review of Systems   Constitutional: Negative for chills, fever and weight loss.   Respiratory: Negative for cough and shortness of breath.    Cardiovascular: Negative for chest pain and palpitations.   Gastrointestinal: Negative for abdominal pain, diarrhea and vomiting.   Genitourinary: Negative for dysuria.   Neurological: Positive for headaches. Negative for tremors.         Past Medical, Family and Social  History: The patient's past medical, family and social history have been reviewed and updated as appropriate within the electronic medical record - see encounter notes.    Compliance: yes    Side effects: None    Risk Parameters:  Patient reports no suicidal ideation  Patient reports no homicidal ideation  Patient reports no self-injurious behavior  Patient reports no violent behavior    Exam (detailed: at least 9 elements; comprehensive: all 15 elements)   Constitutional  Vitals:  Most recent vital signs, dated less than 90 days prior to this appointment, were reviewed.   There were no vitals filed for this visit.     General:  age appropriate, casually dressed, neatly groomed     Musculoskeletal  Muscle Strength/Tone:  no dyskinesia, no tremor   Gait & Station:  Unable to assess     Psychiatric  Speech:  not pressured, rapid or loud, but clearly audible, no articulation errors, studdering or slurring   Mood:    Affect:  euthymic  appropriate, mood-congruent, midline   Thought Process:  goal-directed, logical   Associations:  intact   Thought Content:  normal, no suicidality, no homicidality, delusions, or paranoia   Insight:  has awareness of illness   Judgement: behavior is adequate to circumstances   Orientation:  grossly intact   Memory: intact for content of interview   Language: grossly intact   Attention Span & Concentration:  able to focus   Fund of Knowledge:  intact and appropriate to age and level of education     Assessment and Diagnosis   Status/Progress: Based on the examination today, the patient's problem(s) is/are adequately but not ideally controlled.  New problems have not been presented today.   Co-morbidities are complicating management of the primary condition.  There are no active rule-out diagnoses for this patient at this time.     General Impression: Pt with a h/o depression and prior treatment that included some augmentation with varying success.     Diagnosis:  Major Depressive Disorder  recurrent mild  GOLDIE      Intervention/Counseling/Treatment Plan   · We discussed the potentially vital importance of sleep.  We thoroughly discussed sleep hygiene which he had already known about from the [facility].  · The patient was encouraged to take Benadryl which was likely an ingredient in the headache medication he took last evening.  · We will temporarily increase the number of clonazepam he receives so that he can take up to 3 clonazepam a few nights a week.  We discussed continuing to limit the number of tablets he is dispensed at one time to mitigate overdose risk.  · Continue gabapentin 900 mg twice daily.  We discussed shifting more dosing to nighttime to enhance sleep, however he does not believe the medication is sedating.  · Continue Zoloft 200 mg daily  · Continue Wellbutrin 450mg daily  · Continue psychotherapy with Ms. Stark      Return to Clinic:  1 month

## 2020-05-12 ENCOUNTER — OFFICE VISIT (OUTPATIENT)
Dept: PSYCHIATRY | Facility: CLINIC | Age: 35
End: 2020-05-12
Payer: COMMERCIAL

## 2020-05-12 DIAGNOSIS — F33.0 MDD (MAJOR DEPRESSIVE DISORDER), RECURRENT EPISODE, MILD: Primary | ICD-10-CM

## 2020-05-12 DIAGNOSIS — F41.1 GAD (GENERALIZED ANXIETY DISORDER): ICD-10-CM

## 2020-05-12 PROCEDURE — 90833 PSYTX W PT W E/M 30 MIN: CPT | Mod: 95,,, | Performed by: PSYCHIATRY & NEUROLOGY

## 2020-05-12 PROCEDURE — 90833 PR PSYCHOTHERAPY W/PATIENT W/E&M, 30 MIN (ADD ON): ICD-10-PCS | Mod: 95,,, | Performed by: PSYCHIATRY & NEUROLOGY

## 2020-05-12 PROCEDURE — 99214 OFFICE O/P EST MOD 30 MIN: CPT | Mod: 95,,, | Performed by: PSYCHIATRY & NEUROLOGY

## 2020-05-12 PROCEDURE — 99214 PR OFFICE/OUTPT VISIT, EST, LEVL IV, 30-39 MIN: ICD-10-PCS | Mod: 95,,, | Performed by: PSYCHIATRY & NEUROLOGY

## 2020-05-12 RX ORDER — PRAZOSIN HYDROCHLORIDE 1 MG/1
1 CAPSULE ORAL NIGHTLY
Qty: 30 CAPSULE | Refills: 1 | Status: SHIPPED | OUTPATIENT
Start: 2020-05-12 | End: 2020-07-09

## 2020-05-12 NOTE — PROGRESS NOTES
"The patient location is:  home  The chief complaint leading to consultation is:  Depression and anxiety  Visit type: audiovisual  Total time spent with patient:  35 min  Each patient to whom he or she provides medical services by telemedicine is:  (1) informed of the relationship between the physician and patient and the respective role of any other health care provider with respect to management of the patient; and (2) notified that he or she may decline to receive medical services by telemedicine and may withdraw from such care at any time.    Notes:     Outpatient Psychiatry Follow-Up Visit (MD/NP)    5/12/2020    Clinical Status of Patient:  Outpatient (Ambulatory)    Chief Complaint:  Schuyler Calles Jr. is a 34 y.o. male who presents today for follow-up of depression and anxiety.  Met with patient.      Interval History and Content of Current Session:    I'm okay"  "Just hanging in there."  Still doing therapy twice a week.  Has a lot of anxiety about what is going on in the world (with coronavirus) and everything looks bleak.  Working but took a day off because he felt very depressed 1-2 weeks ago.  When he is working he is effective.  He is working some long "unhealthy" hours sometimes.   Once he is a flow of working he does not want to stop because he is not sure how motivated he will be the following day.  There are no complaints about his performance and he enjoys his work.  Denies serious thoughts of suicide, only the thought that no one would notice if he was not around.      Part of the problem is there is nothing to look forward to, no reason to stay in shape or healthy.  Has not been running.  Instead does things that provide instant gratification, food and watching videos.      He has only had two times when he woke up feeling rested.  Reports dreaming and tossing and turning are a problem.  Again tried 3 clonazepam and a sleep aid but it has not worked again.  Reports part of the problem is " dreams.  He has been on prazosin in the past with benefit.      Drinking 1-3 drinks twice a week.          Prior trials:  prozac - took it for 2-3 yrs before it started to work less well  seroquel - helped for a while, no side effects recalled.  Retrial caused weight gain.    cymbalta - inadequate at greater than 60 mg  abilify - did not help  Buspirone - fogginess  effexor - does not recall  lexapro- became ineffective  Prazosin - helpe with nightmares in the past, discontinued when nightmare resolved, also had some       Psychotherapy:  · Target symptoms: depression  · Why chosen therapy is appropriate versus another modality: relevant to diagnosis, evidence based practice  · Outcome monitoring methods: self-report, observation  · Therapeutic intervention type: supportive psychotherapy  · Topics discussed/themes: islolation, adjusting expectations given the times, work stress, building skills sets for symptom management, symptom recognition  · The patient's response to the intervention is motivated. The patient's progress toward treatment goals is poor.   · Duration of intervention: 18 minutes.    Review of Systems   Constitutional: Negative for chills, fever and weight loss.   Respiratory: Negative for cough and shortness of breath.    Cardiovascular: Negative for chest pain and palpitations.   Gastrointestinal: Negative for abdominal pain, diarrhea and vomiting.   Genitourinary: Negative for dysuria.   Neurological: Positive for headaches. Negative for tremors.       Past Medical, Family and Social History: The patient's past medical, family and social history have been reviewed and updated as appropriate within the electronic medical record - see encounter notes.    Compliance: yes    Side effects: None    Risk Parameters:  Patient reports no suicidal ideation  Patient reports no homicidal ideation  Patient reports no self-injurious behavior  Patient reports no violent behavior    Exam (detailed: at least 9  elements; comprehensive: all 15 elements)   Constitutional  Vitals:  Most recent vital signs, dated less than 90 days prior to this appointment, were reviewed.   There were no vitals filed for this visit.     General:  age appropriate, neatly groomed, casuall dressed and groomed     Musculoskeletal  Muscle Strength/Tone:  no dyskinesia, no tremor   Gait & Station:  Unable to assess     Psychiatric  Speech:  not pressured, rapid or loud, but clearly audible, no articulation errors, studdering or slurring   Mood:    Affect:  euthymic  appropriate, mood-congruent, midline   Thought Process:  goal-directed, logical   Associations:  intact   Thought Content:  normal, no suicidality, no homicidality, delusions, or paranoia   Insight:  has awareness of illness   Judgement: behavior is adequate to circumstances   Orientation:  grossly intact   Memory: intact for content of interview   Language: grossly intact   Attention Span & Concentration:  able to focus   Fund of Knowledge:  intact and appropriate to age and level of education     Assessment and Diagnosis   Status/Progress: Based on the examination today, the patient's problem(s) is/are adequately but not ideally controlled.  New problems have not been presented today.   Co-morbidities are complicating management of the primary condition.  There are no active rule-out diagnoses for this patient at this time.     General Impression: Pt with a h/o depression and prior treatment that included some augmentation with varying success.     Diagnosis:  Major Depressive Disorder recurrent mild  GOLDIE      Intervention/Counseling/Treatment Plan   · Re-trial of prazosin 1 mg nightly as needed for disturbing dreams.  Patient was informed about potential side effects including orthostatic hypotension.  Adjustment of other antihypertensives may be required in order to stay on this medication.  · Continue clonazepam 0.5 mg  · Changed dosing of gabapentin to 600 mg in the morning and  1200 mg at night  · Continue Zoloft 200 mg daily  · Continue Wellbutrin 450mg daily  · Continue psychotherapy with Ms. Stark      Return to Clinic:  1 month

## 2020-07-09 RX ORDER — PRAZOSIN HYDROCHLORIDE 1 MG/1
CAPSULE ORAL
Qty: 30 CAPSULE | Refills: 0 | Status: SHIPPED | OUTPATIENT
Start: 2020-07-09 | End: 2020-08-11

## 2020-09-29 ENCOUNTER — OFFICE VISIT (OUTPATIENT)
Dept: PSYCHIATRY | Facility: CLINIC | Age: 35
End: 2020-09-29
Payer: COMMERCIAL

## 2020-09-29 VITALS
BODY MASS INDEX: 32.63 KG/M2 | DIASTOLIC BLOOD PRESSURE: 83 MMHG | HEART RATE: 83 BPM | SYSTOLIC BLOOD PRESSURE: 125 MMHG | WEIGHT: 208.31 LBS

## 2020-09-29 DIAGNOSIS — F41.1 GAD (GENERALIZED ANXIETY DISORDER): ICD-10-CM

## 2020-09-29 DIAGNOSIS — F33.1 MDD (MAJOR DEPRESSIVE DISORDER), RECURRENT EPISODE, MODERATE: Primary | ICD-10-CM

## 2020-09-29 PROCEDURE — 99214 PR OFFICE/OUTPT VISIT, EST, LEVL IV, 30-39 MIN: ICD-10-PCS | Mod: S$GLB,,, | Performed by: PSYCHIATRY & NEUROLOGY

## 2020-09-29 PROCEDURE — 99999 PR PBB SHADOW E&M-EST. PATIENT-LVL II: ICD-10-PCS | Mod: PBBFAC,,, | Performed by: PSYCHIATRY & NEUROLOGY

## 2020-09-29 PROCEDURE — 99214 OFFICE O/P EST MOD 30 MIN: CPT | Mod: S$GLB,,, | Performed by: PSYCHIATRY & NEUROLOGY

## 2020-09-29 PROCEDURE — 99999 PR PBB SHADOW E&M-EST. PATIENT-LVL II: CPT | Mod: PBBFAC,,, | Performed by: PSYCHIATRY & NEUROLOGY

## 2020-09-29 NOTE — PROGRESS NOTES
Outpatient Psychiatry Follow-Up Visit (MD/NP)    9/29/2020    Clinical Status of Patient:  Outpatient (Ambulatory)    Chief Complaint:  Schuyler Calles Jr. is a 35 y.o. male who presents today for follow-up of depression and anxiety.  Met with patient.      Interval History and Content of Current Session:   Pt expresses his frustration with continuing to take medication and still having depression and anxiety.  Not being able to switch to a different provider was frustrating as well.  He endorses hopelessness.  He is still working, from home.  Reports he did not shower for 9 days prior to today.  Describes a cascade of thoughts related to showering.  If he showers he has to wash the dog, change the sheets, etc. He has not been drinking and states this almost with regret because he states he feels better when he drinks.  He would like to discontinue meds but has an awareness that they are helpful.  He is less convinced about gabapentin.  Remains on prazosin but still having anxiety provoking dreams.      Adding to the frustration is that his therapist, Cassandra has been out for several weeks for maternity leave.          Prior trials:  prozac - took it for 2-3 yrs before it started to work less well  seroquel - helped for a while, no side effects recalled.  Retrial caused weight gain.    cymbalta - inadequate at greater than 60 mg  abilify - did not help  Buspirone - fogginess  effexor - does not recall  lexapro- became ineffective  Prazosin - helpe with nightmares in the past, discontinued when nightmare resolved, also had some       Psychotherapy:  · Target symptoms: depression  · Why chosen therapy is appropriate versus another modality: relevant to diagnosis, evidence based practice  · Outcome monitoring methods: self-report, observation  · Therapeutic intervention type: supportive psychotherapy  · Topics discussed/themes: building skills sets for symptom management, symptom recognition, financial  stressors  · The patient's response to the intervention is motivated. The patient's progress toward treatment goals is poor.   · Duration of intervention: 10 minutes.    Review of Systems   Constitutional: Negative for weight loss.   Psychiatric/Behavioral:        As noted in HPI       Past Medical, Family and Social History: The patient's past medical, family and social history have been reviewed and updated as appropriate within the electronic medical record - see encounter notes.    Compliance: yes    Side effects: None    Risk Parameters:  Patient reports no suicidal ideation  Patient reports no homicidal ideation  Patient reports no self-injurious behavior  Patient reports no violent behavior    Exam (detailed: at least 9 elements; comprehensive: all 15 elements)   Constitutional  Vitals:  Most recent vital signs, dated less than 90 days prior to this appointment, were reviewed.   Vitals:    09/29/20 1111   BP: 125/83   Pulse: 83   Weight: 94.5 kg (208 lb 5.4 oz)        General:  age appropriate, overweight, casuall dressed and groomed     Musculoskeletal  Muscle Strength/Tone:  no dyskinesia, no tremor   Gait & Station:  non-ataxic     Psychiatric  Speech:  not pressured, rapid or loud, but clearly audible, no articulation errors, studdering or slurring   Mood:    Affect:  euthymic  appropriate, mood-congruent, midline   Thought Process:  goal-directed, logical   Associations:  intact   Thought Content:  normal, no suicidality, no homicidality, delusions, or paranoia   Insight:  has awareness of illness   Judgement: behavior is adequate to circumstances   Orientation:  grossly intact   Memory: intact for content of interview   Language: grossly intact   Attention Span & Concentration:  able to focus   Fund of Knowledge:  intact and appropriate to age and level of education     Assessment and Diagnosis   Status/Progress: Based on the examination today, the patient's problem(s) is/are adequately but not ideally  controlled.  New problems have not been presented today.   Co-morbidities are complicating management of the primary condition.  There are no active rule-out diagnoses for this patient at this time.     General Impression: Pt with a h/o depression and prior treatment that included some augmentation with varying success.     Diagnosis:  Major Depressive Disorder recurrent mod  GOLDIE      Intervention/Counseling/Treatment Plan   · Referred patient to Dr. Jerrod Pelaez or [facility] to pursue TMS treatment  · Continue prazosin 1 mg nightly .  · Continue clonazepam 0.5 mg  · Pt may try to slowly reduce gabapentin since he believes it to have been providing little relief.    · Continue Zoloft 200 mg daily  · Continue Wellbutrin 450mg daily  · Continue psychotherapy with Ms. Stark      Return to Clinic:  1 month

## 2020-11-24 DIAGNOSIS — I10 ESSENTIAL HYPERTENSION: ICD-10-CM

## 2020-11-24 RX ORDER — CARVEDILOL 25 MG/1
TABLET ORAL
Qty: 180 TABLET | Refills: 1 | Status: SHIPPED | OUTPATIENT
Start: 2020-11-24 | End: 2021-01-27 | Stop reason: SDUPTHER

## 2020-11-24 RX ORDER — LOSARTAN POTASSIUM 50 MG/1
TABLET ORAL
Qty: 90 TABLET | Refills: 1 | Status: SHIPPED | OUTPATIENT
Start: 2020-11-24 | End: 2021-01-27 | Stop reason: SDUPTHER

## 2020-12-01 RX ORDER — SERTRALINE HYDROCHLORIDE 100 MG/1
TABLET, FILM COATED ORAL
Qty: 180 TABLET | Refills: 0 | Status: SHIPPED | OUTPATIENT
Start: 2020-12-01 | End: 2020-12-02

## 2020-12-02 ENCOUNTER — PATIENT MESSAGE (OUTPATIENT)
Dept: PSYCHIATRY | Facility: CLINIC | Age: 35
End: 2020-12-02

## 2020-12-02 RX ORDER — SERTRALINE HYDROCHLORIDE 100 MG/1
TABLET, FILM COATED ORAL
Qty: 60 TABLET | Refills: 0 | Status: SHIPPED | OUTPATIENT
Start: 2020-12-02 | End: 2020-12-28 | Stop reason: SDUPTHER

## 2020-12-25 ENCOUNTER — PATIENT MESSAGE (OUTPATIENT)
Dept: PSYCHIATRY | Facility: CLINIC | Age: 35
End: 2020-12-25

## 2020-12-28 RX ORDER — SERTRALINE HYDROCHLORIDE 100 MG/1
TABLET, FILM COATED ORAL
Qty: 180 TABLET | Refills: 0 | Status: SHIPPED | OUTPATIENT
Start: 2020-12-28 | End: 2021-03-03

## 2021-01-27 ENCOUNTER — OFFICE VISIT (OUTPATIENT)
Dept: CARDIOLOGY | Facility: CLINIC | Age: 36
End: 2021-01-27
Payer: COMMERCIAL

## 2021-01-27 VITALS
WEIGHT: 215.19 LBS | SYSTOLIC BLOOD PRESSURE: 134 MMHG | BODY MASS INDEX: 33.78 KG/M2 | HEART RATE: 93 BPM | HEIGHT: 67 IN | DIASTOLIC BLOOD PRESSURE: 88 MMHG

## 2021-01-27 DIAGNOSIS — I10 ESSENTIAL HYPERTENSION: Primary | ICD-10-CM

## 2021-01-27 DIAGNOSIS — E78.00 PURE HYPERCHOLESTEROLEMIA: ICD-10-CM

## 2021-01-27 DIAGNOSIS — E66.9 OBESITY (BMI 30.0-34.9): ICD-10-CM

## 2021-01-27 DIAGNOSIS — F33.2 MDD (MAJOR DEPRESSIVE DISORDER), RECURRENT SEVERE, WITHOUT PSYCHOSIS: ICD-10-CM

## 2021-01-27 PROBLEM — E66.811 OBESITY (BMI 30.0-34.9): Status: ACTIVE | Noted: 2021-01-27

## 2021-01-27 PROCEDURE — 99214 PR OFFICE/OUTPT VISIT, EST, LEVL IV, 30-39 MIN: ICD-10-PCS | Mod: S$GLB,,, | Performed by: PHYSICIAN ASSISTANT

## 2021-01-27 PROCEDURE — 99999 PR PBB SHADOW E&M-EST. PATIENT-LVL IV: ICD-10-PCS | Mod: PBBFAC,,, | Performed by: PHYSICIAN ASSISTANT

## 2021-01-27 PROCEDURE — 99999 PR PBB SHADOW E&M-EST. PATIENT-LVL IV: CPT | Mod: PBBFAC,,, | Performed by: PHYSICIAN ASSISTANT

## 2021-01-27 PROCEDURE — 99214 OFFICE O/P EST MOD 30 MIN: CPT | Mod: S$GLB,,, | Performed by: PHYSICIAN ASSISTANT

## 2021-01-27 RX ORDER — LOSARTAN POTASSIUM 100 MG/1
100 TABLET ORAL DAILY
Qty: 30 TABLET | Refills: 11 | Status: SHIPPED | OUTPATIENT
Start: 2021-01-27 | End: 2021-02-04 | Stop reason: SDUPTHER

## 2021-01-27 RX ORDER — CARVEDILOL 25 MG/1
25 TABLET ORAL 2 TIMES DAILY
Qty: 60 TABLET | Refills: 11 | Status: SHIPPED | OUTPATIENT
Start: 2021-01-27 | End: 2021-02-04 | Stop reason: SDUPTHER

## 2021-01-29 ENCOUNTER — LAB VISIT (OUTPATIENT)
Dept: LAB | Facility: HOSPITAL | Age: 36
End: 2021-01-29
Attending: PHYSICIAN ASSISTANT
Payer: COMMERCIAL

## 2021-01-29 DIAGNOSIS — E78.00 PURE HYPERCHOLESTEROLEMIA: ICD-10-CM

## 2021-01-29 LAB
CHOLEST SERPL-MCNC: 221 MG/DL (ref 120–199)
CHOLEST/HDLC SERPL: 4.1 {RATIO} (ref 2–5)
HDLC SERPL-MCNC: 54 MG/DL (ref 40–75)
HDLC SERPL: 24.4 % (ref 20–50)
LDLC SERPL CALC-MCNC: 142.6 MG/DL (ref 63–159)
NONHDLC SERPL-MCNC: 167 MG/DL
TRIGL SERPL-MCNC: 122 MG/DL (ref 30–150)

## 2021-01-29 PROCEDURE — 80061 LIPID PANEL: CPT

## 2021-01-29 PROCEDURE — 36415 COLL VENOUS BLD VENIPUNCTURE: CPT | Mod: PO

## 2021-02-04 ENCOUNTER — PATIENT MESSAGE (OUTPATIENT)
Dept: CARDIOLOGY | Facility: CLINIC | Age: 36
End: 2021-02-04

## 2021-02-04 DIAGNOSIS — I10 ESSENTIAL HYPERTENSION: ICD-10-CM

## 2021-02-04 RX ORDER — LOSARTAN POTASSIUM 100 MG/1
100 TABLET ORAL DAILY
Qty: 90 TABLET | Refills: 3 | Status: SHIPPED | OUTPATIENT
Start: 2021-02-04 | End: 2022-02-14 | Stop reason: SDUPTHER

## 2021-02-04 RX ORDER — CARVEDILOL 25 MG/1
25 TABLET ORAL 2 TIMES DAILY
Qty: 180 TABLET | Refills: 3 | Status: SHIPPED | OUTPATIENT
Start: 2021-02-04 | End: 2022-04-14 | Stop reason: SDUPTHER

## 2021-03-15 ENCOUNTER — IMMUNIZATION (OUTPATIENT)
Dept: FAMILY MEDICINE | Facility: CLINIC | Age: 36
End: 2021-03-15
Payer: COMMERCIAL

## 2021-03-15 DIAGNOSIS — Z23 NEED FOR VACCINATION: Primary | ICD-10-CM

## 2021-03-15 PROCEDURE — 91300 COVID-19, MRNA, LNP-S, PF, 30 MCG/0.3 ML DOSE VACCINE: CPT | Mod: PBBFAC | Performed by: FAMILY MEDICINE

## 2021-04-06 ENCOUNTER — IMMUNIZATION (OUTPATIENT)
Dept: FAMILY MEDICINE | Facility: CLINIC | Age: 36
End: 2021-04-06
Payer: COMMERCIAL

## 2021-04-06 DIAGNOSIS — Z23 NEED FOR VACCINATION: Primary | ICD-10-CM

## 2021-04-06 PROCEDURE — 91300 COVID-19, MRNA, LNP-S, PF, 30 MCG/0.3 ML DOSE VACCINE: CPT | Mod: S$GLB,,, | Performed by: FAMILY MEDICINE

## 2021-04-06 PROCEDURE — 91300 COVID-19, MRNA, LNP-S, PF, 30 MCG/0.3 ML DOSE VACCINE: ICD-10-PCS | Mod: S$GLB,,, | Performed by: FAMILY MEDICINE

## 2021-04-06 PROCEDURE — 0002A COVID-19, MRNA, LNP-S, PF, 30 MCG/0.3 ML DOSE VACCINE: ICD-10-PCS | Mod: CV19,S$GLB,, | Performed by: FAMILY MEDICINE

## 2021-04-06 PROCEDURE — 0002A COVID-19, MRNA, LNP-S, PF, 30 MCG/0.3 ML DOSE VACCINE: CPT | Mod: CV19,S$GLB,, | Performed by: FAMILY MEDICINE

## 2021-07-28 ENCOUNTER — OFFICE VISIT (OUTPATIENT)
Dept: PSYCHIATRY | Facility: CLINIC | Age: 36
End: 2021-07-28
Payer: COMMERCIAL

## 2021-07-28 DIAGNOSIS — F33.40 MDD (RECURRENT MAJOR DEPRESSIVE DISORDER) IN REMISSION: ICD-10-CM

## 2021-07-28 DIAGNOSIS — F41.1 GAD (GENERALIZED ANXIETY DISORDER): Primary | ICD-10-CM

## 2021-07-28 PROCEDURE — 99213 OFFICE O/P EST LOW 20 MIN: CPT | Mod: 95,,, | Performed by: PSYCHIATRY & NEUROLOGY

## 2021-07-28 PROCEDURE — 99213 PR OFFICE/OUTPT VISIT, EST, LEVL III, 20-29 MIN: ICD-10-PCS | Mod: 95,,, | Performed by: PSYCHIATRY & NEUROLOGY

## 2021-07-28 RX ORDER — BUPROPION HYDROCHLORIDE 150 MG/1
450 TABLET ORAL DAILY
Qty: 270 TABLET | Refills: 0 | Status: SHIPPED | OUTPATIENT
Start: 2021-07-28 | End: 2021-12-20 | Stop reason: SDUPTHER

## 2021-07-28 RX ORDER — SERTRALINE HYDROCHLORIDE 100 MG/1
TABLET, FILM COATED ORAL
Qty: 180 TABLET | Refills: 0 | Status: SHIPPED | OUTPATIENT
Start: 2021-07-28 | End: 2021-12-20 | Stop reason: SDUPTHER

## 2021-09-17 ENCOUNTER — PATIENT MESSAGE (OUTPATIENT)
Dept: INTERNAL MEDICINE | Facility: CLINIC | Age: 36
End: 2021-09-17

## 2021-10-22 ENCOUNTER — IMMUNIZATION (OUTPATIENT)
Dept: INTERNAL MEDICINE | Facility: CLINIC | Age: 36
End: 2021-10-22
Payer: COMMERCIAL

## 2021-10-22 PROCEDURE — 90686 FLU VACCINE (QUAD) GREATER THAN OR EQUAL TO 3YO PRESERVATIVE FREE IM: ICD-10-PCS | Mod: S$GLB,,, | Performed by: FAMILY MEDICINE

## 2021-10-22 PROCEDURE — 90686 IIV4 VACC NO PRSV 0.5 ML IM: CPT | Mod: S$GLB,,, | Performed by: FAMILY MEDICINE

## 2021-10-22 PROCEDURE — 90471 FLU VACCINE (QUAD) GREATER THAN OR EQUAL TO 3YO PRESERVATIVE FREE IM: ICD-10-PCS | Mod: S$GLB,,, | Performed by: FAMILY MEDICINE

## 2021-10-22 PROCEDURE — 90471 IMMUNIZATION ADMIN: CPT | Mod: S$GLB,,, | Performed by: FAMILY MEDICINE

## 2021-11-07 ENCOUNTER — IMMUNIZATION (OUTPATIENT)
Dept: PRIMARY CARE CLINIC | Facility: CLINIC | Age: 36
End: 2021-11-07
Payer: COMMERCIAL

## 2021-11-07 DIAGNOSIS — Z23 NEED FOR VACCINATION: Primary | ICD-10-CM

## 2021-11-07 PROCEDURE — 0004A COVID-19, MRNA, LNP-S, PF, 30 MCG/0.3 ML DOSE VACCINE: CPT | Mod: CV19,PBBFAC | Performed by: INTERNAL MEDICINE

## 2021-12-20 ENCOUNTER — OFFICE VISIT (OUTPATIENT)
Dept: PSYCHIATRY | Facility: CLINIC | Age: 36
End: 2021-12-20
Payer: COMMERCIAL

## 2021-12-20 VITALS
SYSTOLIC BLOOD PRESSURE: 122 MMHG | BODY MASS INDEX: 34.67 KG/M2 | WEIGHT: 221.31 LBS | DIASTOLIC BLOOD PRESSURE: 80 MMHG | HEART RATE: 84 BPM

## 2021-12-20 DIAGNOSIS — F33.40 MDD (RECURRENT MAJOR DEPRESSIVE DISORDER) IN REMISSION: ICD-10-CM

## 2021-12-20 PROCEDURE — 99213 PR OFFICE/OUTPT VISIT, EST, LEVL III, 20-29 MIN: ICD-10-PCS | Mod: S$GLB,,, | Performed by: PSYCHIATRY & NEUROLOGY

## 2021-12-20 PROCEDURE — 99999 PR PBB SHADOW E&M-EST. PATIENT-LVL II: CPT | Mod: PBBFAC,,, | Performed by: PSYCHIATRY & NEUROLOGY

## 2021-12-20 PROCEDURE — 99213 OFFICE O/P EST LOW 20 MIN: CPT | Mod: S$GLB,,, | Performed by: PSYCHIATRY & NEUROLOGY

## 2021-12-20 PROCEDURE — 90833 PR PSYCHOTHERAPY W/PATIENT W/E&M, 30 MIN (ADD ON): ICD-10-PCS | Mod: S$GLB,,, | Performed by: PSYCHIATRY & NEUROLOGY

## 2021-12-20 PROCEDURE — 90833 PSYTX W PT W E/M 30 MIN: CPT | Mod: S$GLB,,, | Performed by: PSYCHIATRY & NEUROLOGY

## 2021-12-20 PROCEDURE — 99999 PR PBB SHADOW E&M-EST. PATIENT-LVL II: ICD-10-PCS | Mod: PBBFAC,,, | Performed by: PSYCHIATRY & NEUROLOGY

## 2021-12-20 RX ORDER — ESZOPICLONE 3 MG/1
3 TABLET, FILM COATED ORAL NIGHTLY
Qty: 30 TABLET | Refills: 3 | Status: SHIPPED | OUTPATIENT
Start: 2021-12-20 | End: 2022-01-19

## 2021-12-20 RX ORDER — BUPROPION HYDROCHLORIDE 150 MG/1
450 TABLET ORAL DAILY
Qty: 270 TABLET | Refills: 0 | Status: SHIPPED | OUTPATIENT
Start: 2021-12-20

## 2021-12-20 RX ORDER — SERTRALINE HYDROCHLORIDE 100 MG/1
TABLET, FILM COATED ORAL
Qty: 180 TABLET | Refills: 0 | Status: SHIPPED | OUTPATIENT
Start: 2021-12-20

## 2022-02-14 ENCOUNTER — PATIENT MESSAGE (OUTPATIENT)
Dept: CARDIOLOGY | Facility: CLINIC | Age: 37
End: 2022-02-14
Payer: COMMERCIAL

## 2022-02-14 DIAGNOSIS — I10 ESSENTIAL HYPERTENSION: ICD-10-CM

## 2022-02-14 RX ORDER — LOSARTAN POTASSIUM 100 MG/1
100 TABLET ORAL DAILY
Qty: 90 TABLET | Refills: 0 | Status: SHIPPED | OUTPATIENT
Start: 2022-02-14 | End: 2022-04-14 | Stop reason: SDUPTHER

## 2022-04-14 ENCOUNTER — OFFICE VISIT (OUTPATIENT)
Dept: CARDIOLOGY | Facility: CLINIC | Age: 37
End: 2022-04-14
Payer: COMMERCIAL

## 2022-04-14 VITALS
OXYGEN SATURATION: 97 % | WEIGHT: 228.63 LBS | HEIGHT: 67 IN | DIASTOLIC BLOOD PRESSURE: 78 MMHG | HEART RATE: 72 BPM | BODY MASS INDEX: 35.88 KG/M2 | SYSTOLIC BLOOD PRESSURE: 129 MMHG

## 2022-04-14 DIAGNOSIS — E66.09 NON MORBID OBESITY DUE TO EXCESS CALORIES: ICD-10-CM

## 2022-04-14 DIAGNOSIS — I10 ESSENTIAL HYPERTENSION: Primary | ICD-10-CM

## 2022-04-14 DIAGNOSIS — E78.00 PURE HYPERCHOLESTEROLEMIA: ICD-10-CM

## 2022-04-14 DIAGNOSIS — E66.9 OBESITY (BMI 30.0-34.9): ICD-10-CM

## 2022-04-14 PROCEDURE — 99999 PR PBB SHADOW E&M-EST. PATIENT-LVL IV: CPT | Mod: PBBFAC,,, | Performed by: PHYSICIAN ASSISTANT

## 2022-04-14 PROCEDURE — 99999 PR PBB SHADOW E&M-EST. PATIENT-LVL IV: ICD-10-PCS | Mod: PBBFAC,,, | Performed by: PHYSICIAN ASSISTANT

## 2022-04-14 PROCEDURE — 99214 OFFICE O/P EST MOD 30 MIN: CPT | Mod: S$GLB,,, | Performed by: PHYSICIAN ASSISTANT

## 2022-04-14 PROCEDURE — 99214 PR OFFICE/OUTPT VISIT, EST, LEVL IV, 30-39 MIN: ICD-10-PCS | Mod: S$GLB,,, | Performed by: PHYSICIAN ASSISTANT

## 2022-04-14 RX ORDER — CARVEDILOL 25 MG/1
25 TABLET ORAL 2 TIMES DAILY
Qty: 180 TABLET | Refills: 3 | Status: SHIPPED | OUTPATIENT
Start: 2022-04-14 | End: 2023-04-09

## 2022-04-14 RX ORDER — BREXPIPRAZOLE 2 MG/1
2 TABLET ORAL
COMMUNITY

## 2022-04-14 RX ORDER — LOSARTAN POTASSIUM 100 MG/1
100 TABLET ORAL DAILY
Qty: 90 TABLET | Refills: 3 | Status: SHIPPED | OUTPATIENT
Start: 2022-04-14 | End: 2023-04-09

## 2022-04-14 NOTE — PROGRESS NOTES
General Cardiology Clinic Note  Reason for Visit: Annual follow up   Last Clinic Visit: 1/7/2021    HPI:   Schuyler Calles Jr. is a 36 y.o. male who presents for annual follow up.     Problems:  Hypertension  Hyperlipidemia  Depression   Obesity     Interval history   Doing well. No cardiac complaints. No regimented aerobic exercise, but does yard work for 3-4 hours once a week. Charly symptoms of CAD, arrhythmia, CHF, hypotension.     HPI 1/27/2021  He has been doing well. He recently completed TMS treatment for depression and has been very happy with the results. He does not have any cardiac complaints. He is on Coreg and Losartan for hypertension, but has not been checking his BP consistently at home. He has not been exercising because he is an  and is very busy this time of the year, but now that he is in a good mental state, he plans to resume it as soon as he can. He denies CP, LEGGETT, palpitations, lightheadedness, syncope, PND, orthopnea, pedal edema.     ROS:      Review of Systems   Constitutional: Negative for diaphoresis, malaise/fatigue, weight gain and weight loss.   HENT: Negative for nosebleeds.    Eyes: Negative for vision loss in left eye, vision loss in right eye and visual disturbance.   Cardiovascular: Negative for chest pain, claudication, dyspnea on exertion, irregular heartbeat, leg swelling, near-syncope, orthopnea, palpitations, paroxysmal nocturnal dyspnea and syncope.   Respiratory: Negative for cough, shortness of breath, sleep disturbances due to breathing, snoring and wheezing.    Hematologic/Lymphatic: Negative for bleeding problem. Does not bruise/bleed easily.   Skin: Negative for poor wound healing and rash.   Musculoskeletal: Negative for muscle cramps and myalgias.   Gastrointestinal: Negative for bloating, abdominal pain, diarrhea, heartburn, melena, nausea and vomiting.   Genitourinary: Negative for hematuria and nocturia.   Neurological: Negative for brief  paralysis, dizziness, headaches, light-headedness, numbness and weakness.   Psychiatric/Behavioral: Negative for depression.   Allergic/Immunologic: Negative for hives.       PMH:     Past Medical History:   Diagnosis Date    Anxiety     Depression     History of psychiatric care     History of psychiatric hospitalization     feb.'19 was at [facility].  2020/1    Hx of psychiatric care     currently on klonopin, wellbutrin, neurotin    Hypertension     Panic disorder     Psychiatric exam requested by authority     Psychiatric problem     debilitating panic, mental illness since 18 yo    PTSD (post-traumatic stress disorder)     Substance abuse     Suicide attempt     over dose in Feb '97.     Therapy     states sees JULIA López 2-3 times /week and also Dr Angelo Ramsay for medication adjustments    Withdrawal symptoms, drug or narcotic     after overdose on klonopin     No past surgical history on file.  Allergies:     Review of patient's allergies indicates:   Allergen Reactions    Cat dander     Grass pollen-june grass standard      Medications:     Current Outpatient Medications on File Prior to Visit   Medication Sig Dispense Refill    buPROPion (WELLBUTRIN XL) 150 MG TB24 tablet Take 3 tablets (450 mg total) by mouth once daily. 270 tablet 0    carvediloL (COREG) 25 MG tablet Take 1 tablet (25 mg total) by mouth 2 (two) times daily. 180 tablet 3    losartan (COZAAR) 100 MG tablet Take 1 tablet (100 mg total) by mouth once daily. Patient needs to schedule an appointment in the consult cardiology clinic. 90 tablet 0    multivitamin capsule Take 1 capsule by mouth once daily.      sertraline (ZOLOFT) 100 MG tablet TAKE 2 TABLETS (200MG) ONCEDAILY 180 tablet 0    TRUVADA 200-300 mg Tab Take 1 tablet by mouth once daily.        No current facility-administered medications on file prior to visit.     Social History:     Social History     Tobacco Use    Smoking status: Never Smoker     Smokeless tobacco: Never Used   Substance Use Topics    Alcohol use: Yes     Alcohol/week: 1.0 standard drink     Types: 1 Glasses of wine per week     Comment: 2 glasses of wine once or twice weekly     Family History:     Family History   Problem Relation Age of Onset    Cancer Mother          lung ca    Heart disease Mother         chf    Hypertension Mother     Asthma Sister     Drug abuse Sister     Depression Sister     Heart disease Maternal Grandfather         chf    Stroke Maternal Grandfather     Drug abuse Father     Suicide Other      Physical Exam:   There were no vitals taken for this visit.       Physical Exam  Vitals and nursing note reviewed.   Constitutional:       General: He is not in acute distress.     Appearance: Normal appearance.   HENT:      Head: Normocephalic and atraumatic.      Nose: Nose normal.   Eyes:      General: No scleral icterus.     Extraocular Movements: Extraocular movements intact.      Conjunctiva/sclera: Conjunctivae normal.   Neck:      Thyroid: No thyromegaly.      Vascular: No carotid bruit or JVD.   Cardiovascular:      Rate and Rhythm: Normal rate and regular rhythm.      Pulses: Normal pulses.      Heart sounds: Normal heart sounds. No murmur heard.    No friction rub. No gallop.   Pulmonary:      Effort: Pulmonary effort is normal.      Breath sounds: Normal breath sounds. No wheezing, rhonchi or rales.   Chest:      Chest wall: No tenderness.   Abdominal:      General: Bowel sounds are normal. There is no distension.      Palpations: Abdomen is soft.      Tenderness: There is no abdominal tenderness.   Musculoskeletal:      Cervical back: Neck supple.      Right lower leg: No edema.      Left lower leg: No edema.   Skin:     General: Skin is warm and dry.      Coloration: Skin is not pale.      Findings: No erythema or rash.      Nails: There is no clubbing.   Neurological:      Mental Status: He is alert and oriented to person, place, and time.    Psychiatric:         Mood and Affect: Mood and affect normal.         Behavior: Behavior normal.          Labs:     Lab Results   Component Value Date     01/05/2020    K 3.8 01/05/2020     01/05/2020    CO2 21 (L) 01/05/2020    BUN 18 01/05/2020    CREATININE 1.0 01/05/2020    ANIONGAP 13 01/05/2020     Lab Results   Component Value Date    HGBA1C 4.8 01/07/2020     Lab Results   Component Value Date    BNP 32 02/02/2014    Lab Results   Component Value Date    WBC 5.81 01/05/2020    HGB 14.7 01/05/2020    HCT 42.0 01/05/2020     01/05/2020    GRAN 3.2 01/05/2020    GRAN 55.5 01/05/2020     Lab Results   Component Value Date    CHOL 221 (H) 01/29/2021    HDL 54 01/29/2021    LDLCALC 142.6 01/29/2021    TRIG 122 01/29/2021          Imaging:   Renal artery ultrasound 3/24/2014  CONCLUSIONS   There is insignificant stenosis (0-59%) in the Right renal artery.   Right kidney 11.1 cm.   There is insignificant stenosis (0-59%) in the Left renal artery.   Left kidney 11.6 cm.     EKG 1/5/2020: Sinus tachycardia, , nonspecific inferior TWIs, cannot rule out inferior infarct    Assessment:     1. Essential hypertension    2. Pure hypercholesterolemia    3. Non morbid obesity due to excess calories    4. Obesity (BMI 30.0-34.9)      Plan:     Hypertension  Controlled  Continue Losartan 100 mg daily  Continue Coreg 25 mg bid   DASH diet    Hyperlipidemia  Check lipids and CMP      Obesity   Increase aerobic exercise with a goal of at least 30 minutes, 5 days a week.    Follow up in one year    Signed:  Jenna Babb PA-C  Cardiology   [phone] - General  4/14/2022 12:16 PM

## 2022-04-19 ENCOUNTER — LAB VISIT (OUTPATIENT)
Dept: LAB | Facility: HOSPITAL | Age: 37
End: 2022-04-19
Attending: PHYSICIAN ASSISTANT
Payer: COMMERCIAL

## 2022-04-19 DIAGNOSIS — I10 ESSENTIAL HYPERTENSION: ICD-10-CM

## 2022-04-19 DIAGNOSIS — E78.00 PURE HYPERCHOLESTEROLEMIA: ICD-10-CM

## 2022-04-19 LAB
ALBUMIN SERPL BCP-MCNC: 4.1 G/DL (ref 3.5–5.2)
ALP SERPL-CCNC: 99 U/L (ref 55–135)
ALT SERPL W/O P-5'-P-CCNC: 49 U/L (ref 10–44)
ANION GAP SERPL CALC-SCNC: 10 MMOL/L (ref 8–16)
AST SERPL-CCNC: 33 U/L (ref 10–40)
BILIRUB SERPL-MCNC: 0.8 MG/DL (ref 0.1–1)
BUN SERPL-MCNC: 14 MG/DL (ref 6–20)
CALCIUM SERPL-MCNC: 9.3 MG/DL (ref 8.7–10.5)
CHLORIDE SERPL-SCNC: 105 MMOL/L (ref 95–110)
CHOLEST SERPL-MCNC: 228 MG/DL (ref 120–199)
CHOLEST/HDLC SERPL: 4.6 {RATIO} (ref 2–5)
CO2 SERPL-SCNC: 25 MMOL/L (ref 23–29)
CREAT SERPL-MCNC: 1.1 MG/DL (ref 0.5–1.4)
EST. GFR  (AFRICAN AMERICAN): >60 ML/MIN/1.73 M^2
EST. GFR  (NON AFRICAN AMERICAN): >60 ML/MIN/1.73 M^2
GLUCOSE SERPL-MCNC: 83 MG/DL (ref 70–110)
HDLC SERPL-MCNC: 50 MG/DL (ref 40–75)
HDLC SERPL: 21.9 % (ref 20–50)
LDLC SERPL CALC-MCNC: 140.8 MG/DL (ref 63–159)
NONHDLC SERPL-MCNC: 178 MG/DL
POTASSIUM SERPL-SCNC: 4 MMOL/L (ref 3.5–5.1)
PROT SERPL-MCNC: 7.6 G/DL (ref 6–8.4)
SODIUM SERPL-SCNC: 140 MMOL/L (ref 136–145)
TRIGL SERPL-MCNC: 186 MG/DL (ref 30–150)

## 2022-04-19 PROCEDURE — 80053 COMPREHEN METABOLIC PANEL: CPT | Performed by: PHYSICIAN ASSISTANT

## 2022-04-19 PROCEDURE — 80061 LIPID PANEL: CPT | Performed by: PHYSICIAN ASSISTANT

## 2022-04-19 PROCEDURE — 36415 COLL VENOUS BLD VENIPUNCTURE: CPT | Mod: PO | Performed by: PHYSICIAN ASSISTANT

## 2024-07-16 ENCOUNTER — OFFICE VISIT (OUTPATIENT)
Dept: URGENT CARE | Facility: CLINIC | Age: 39
End: 2024-07-16
Payer: COMMERCIAL

## 2024-07-16 VITALS
DIASTOLIC BLOOD PRESSURE: 101 MMHG | RESPIRATION RATE: 17 BRPM | OXYGEN SATURATION: 96 % | HEART RATE: 84 BPM | HEIGHT: 67 IN | SYSTOLIC BLOOD PRESSURE: 149 MMHG | WEIGHT: 228.63 LBS | BODY MASS INDEX: 35.88 KG/M2 | TEMPERATURE: 99 F

## 2024-07-16 DIAGNOSIS — U07.1 COVID-19 VIRUS DETECTED: ICD-10-CM

## 2024-07-16 DIAGNOSIS — U07.1 LAB TEST POSITIVE FOR DETECTION OF COVID-19 VIRUS: ICD-10-CM

## 2024-07-16 DIAGNOSIS — J06.9 URI, ACUTE: ICD-10-CM

## 2024-07-16 DIAGNOSIS — R05.9 COUGH, UNSPECIFIED TYPE: Primary | ICD-10-CM

## 2024-07-16 LAB
CTP QC/QA: YES
SARS-COV-2 AG RESP QL IA.RAPID: POSITIVE

## 2024-07-16 PROCEDURE — 87811 SARS-COV-2 COVID19 W/OPTIC: CPT | Mod: QW,S$GLB,, | Performed by: FAMILY MEDICINE

## 2024-07-16 PROCEDURE — 99203 OFFICE O/P NEW LOW 30 MIN: CPT | Mod: S$GLB,,, | Performed by: FAMILY MEDICINE

## 2024-07-16 RX ORDER — LOSARTAN POTASSIUM AND HYDROCHLOROTHIAZIDE 25; 100 MG/1; MG/1
1 TABLET ORAL DAILY
COMMUNITY

## 2024-07-16 RX ORDER — VORTIOXETINE 20 MG/1
20 TABLET, FILM COATED ORAL
COMMUNITY

## 2024-07-16 RX ORDER — METOPROLOL TARTRATE 50 MG/1
50 TABLET ORAL 2 TIMES DAILY
COMMUNITY

## 2024-07-16 RX ORDER — ONDANSETRON 4 MG/1
4 TABLET, ORALLY DISINTEGRATING ORAL EVERY 6 HOURS PRN
Qty: 15 TABLET | Refills: 0 | Status: SHIPPED | OUTPATIENT
Start: 2024-07-16 | End: 2024-07-22 | Stop reason: ALTCHOICE

## 2024-07-16 RX ORDER — LORATADINE 10 MG/1
10 TABLET ORAL DAILY
Qty: 30 TABLET | Refills: 2 | Status: SHIPPED | OUTPATIENT
Start: 2024-07-16 | End: 2025-07-16

## 2024-07-16 RX ORDER — MINOXIDIL 2.5 MG/1
2.5 TABLET ORAL DAILY
COMMUNITY

## 2024-07-16 RX ORDER — FINASTERIDE 1 MG/1
1 TABLET, FILM COATED ORAL DAILY
COMMUNITY

## 2024-07-16 RX ORDER — NIRMATRELVIR AND RITONAVIR 300-100 MG
KIT ORAL
Qty: 30 TABLET | Refills: 0 | Status: SHIPPED | OUTPATIENT
Start: 2024-07-16

## 2024-07-16 RX ORDER — BENZONATATE 100 MG/1
200 CAPSULE ORAL 3 TIMES DAILY PRN
Qty: 30 CAPSULE | Refills: 1 | Status: SHIPPED | OUTPATIENT
Start: 2024-07-16

## 2024-07-16 NOTE — PATIENT INSTRUCTIONS
You have tested positive for COVID-19 today.  Per the CDC, you are now in a 5 day isolation.    This isolation starts from the day you first developed symptoms, not the day of your positive test. For example, if your symptoms began on a Monday, and you waited until Friday of the same week to get tested, and it was positive, your 5 day isolation begins from that Monday, not the Friday you tested positive.    However, if you are asymptomatic (a person who does not have any symptoms), and received a COVID-19 test that was positive, your 5 day isolation begins on the day you tested positive.  This is regardless if you were exposed to a known positive days earlier.  So for example, if you test positive as an asymptomatic on day 7 from exposure, you have now extended your 5 day quarantine.    In order to return to activities of daily living per the CDC guidelines, you can be around other after: 5 days since symptoms first appeared, 24 hours with no fever without the use of fever-reducing medications, and other symptoms (besides loss of taste or smell) must be improving.  Once you meet all these requirements you may return to your normal activities including social distancing, wearing masks, and frequent handwashing - YOU DO NOT NEED ANOTHER TEST, OR TO TEST NEGATIVE, IN ORDER TO END YOUR QUARANTINE!    Anyone who has been exposed to you since 2 days before your symptoms started should follow CDC guidelines for quarantine.  The CDC recommends quarantine if you have been in close contact (within 6 feet of someone for a cumulative total of 15 minutes or more over a 24-hour period) with someone who has COVID-19, unless you have been fully vaccinated. People who are fully vaccinated do NOT need to quarantine after contact with someone who had COVID-19 unless they have symptoms. However, fully vaccinated people should get tested 3-5 days after their exposure, even they dont have symptoms and wear a mask indoors in public for  10 days following exposure or until their test result is negative.  Individuals are recommended to stay home for 5 days after your last contact with a person who has COVID-19.  Watch for symptoms of COVID-19.  If possible, stay away from people you live with, especially people who are at higher risk for getting very sick from COVID-19.  If you'd like to end your quarantine early, your options are as follows.  You may end your quarantine on day 7 IF you have a negative covid test at least 5 days from last known exposure with no covid symptoms by day 7.  Or you may end quarantine on day with no test and no covid symptoms.    Important home care recommendations include: monitor your symptoms, if you have trouble breathing please go to the ER. Stay in a separate room from other household members, if possible.  Use a separate bathroom, if possible.  Avoid contact with other members of the household and pets.  Don't share personal household items, like cups, towels, and utensils.  Wear a mask when around other people if able.  Please refer to CDC's websit for more information about what to do if you you're sick and how to notify your contacts.    Stay home except to get medical care. Most people with COVID-19 have mild illness and can recover at home without medical care. Do not leave your home, except to get medical care. Do not visit public areas. Get rest and stay hydrated. Call before you get medical care. Be sure to get care if you have trouble breathing, or have any other emergency warning signs, or if you think it is an emergency.  Avoid public transportation, ride-sharing, or taxis.  Separate yourself from other people.  As much as possible, stay in a specific room and away from other people and pets in your home. If possible, you should use a separate bathroom. If you need to be around other people or animals in or outside of the home, wear a mask.    Tell your close contacts that they may have been exposed to  COVID-19. An infected person can spread COVID-19 starting 48 hours (or 2 days) before the person has any symptoms or tests positive. By letting your close contacts know they may have been exposed to COVID-19, you are helping to protect everyone.    Additional guidance is available for those living in close quarters and shared housing on CDC's web site.  Please see COVID-19 and Animals if you have questions about pets.  If you are diagnosed with COVID-19, someone from the health department may call you. Answer the call to slow the spread.  Look for emergency warning signs* for COVID-19. If someone is showing any of these signs, seek emergency medical care immediately:  Trouble breathing  Persistent pain or pressure in the chest  New confusion  Inability to wake or stay awake  Pale, gray, or blue-colored skin, lips, or nail beds, depending on skin tone  *This list is not all possible symptoms. Please call your medical provider for any other symptoms that are severe or concerning to you.    Call ahead before visiting your doctor  Call ahead. Many medical visits for routine care are being postponed or done by phone or telemedicine.  If you have a medical appointment that cannot be postponed, call your doctors office, and tell them you have or may have COVID-19. This will help the office protect themselves and other patients.    You dont need to wear the mask if you are alone. If you cant put on a mask (because of trouble breathing, for example), cover your coughs and sneezes in some other way. Try to stay at least 6 feet away from other people. This will help protect the people around you.  Masks should not be placed on young children under age 2 years, anyone who has trouble breathing, or anyone who is not able to remove the mask without help.    Cover your coughs and sneezes  Cover your mouth and nose with a tissue when you cough or sneeze.  Throw away used tissues in a lined trash can.  Immediately wash your hands  with soap and water for at least 20 seconds. If soap and water are not available, clean your hands with an alcohol-based hand  that contains at least 60% alcohol.  hands wash light icon  Clean your hands often  Wash your hands often with soap and water for at least 20 seconds. This is especially important after blowing your nose, coughing, or sneezing; going to the bathroom; and before eating or preparing food.  Use hand  if soap and water are not available. Use an alcohol-based hand  with at least 60% alcohol, covering all surfaces of your hands and rubbing them together until they feel dry.  Soap and water are the best option, especially if hands are visibly dirty.  Avoid touching your eyes, nose, and mouth with unwashed hands.  Handwashing Tips  Avoid sharing personal household items  Do not share dishes, drinking glasses, cups, eating utensils, towels, or bedding with other people in your home.  Wash these items thoroughly after using them with soap and water or put in the .  spraybottle icon  Clean all high-touch surfaces everyday  Clean and disinfect high-touch surfaces in your sick room and bathroom; wear disposable gloves. Let someone else clean and disinfect surfaces in common areas, but you should clean your bedroom and bathroom, if possible.  If a caregiver or other person needs to clean and disinfect a sick persons bedroom or bathroom, they should do so on an as-needed basis. The caregiver/other person should wear a mask and disposable gloves prior to cleaning. They should wait as long as possible after the person who is sick has used the bathroom before coming in to clean and use the bathroom.  High-touch surfaces include phones, remote controls, counters, tabletops, doorknobs, bathroom fixtures, toilets, keyboards, tablets, and bedside tables.    Clean and disinfect areas that may have blood, stool, or body fluids on them.  Use household  and  disinfectants. Clean the area or item with soap and water or another detergent if it is dirty. Then, use a household disinfectant.  Be sure to follow the instructions on the label to ensure safe and effective use of the product. Many products recommend keeping the surface wet for several minutes to ensure germs are killed. Many also recommend precautions such as wearing gloves and making sure you have good ventilation during use of the product.  Use a product from EPAs List N: Disinfectants for Coronavirus (COVID-19)external icon.  Complete Disinfection Guidance      PLEASE READ YOUR DISCHARGE INSTRUCTIONS ENTIRELY AS IT CONTAINS IMPORTANT INFORMATION.    Please drink plenty of fluids.    Please get plenty of rest.    If not allergic, please take over the counter Tylenol (Acetaminophen) and/or Motrin (Ibuprofen) as directed for control of muscle aches, pain, and/or fever.    Please go to the Emergency Department for any shortness of breath or difficulty breathing.    For congestion, runny nose, or post nasal drip please take an over the counter antihistamine medication (Claritin/Zyrtec/Allegra) of your choice as directed or try an over the counter decongestant like Sudafed. You buy this behind the pharmacy counter.  You can also buy combination OTC antihistamine plus decongestant medications such at Zyrtec-D or Claritin-D.  Do not take decongestant if you suffer from high blood pressure.    For cough, you may be prescribed medication.  Take as prescribed.  If you were not prescribed any medication, you can use over the counter cough medications such as Robitussin.  If you have chest congestion you can consider using over the counter Mucinex but make sure you drink plenty of water to encourage mobilization of the secretions.    If you do have high blood pressure or palpitations, it is safe to take Coricidin HBP for relief of sinus symptoms.    Sore throat recommendations: Warm fluids (tea with honey, soup, broth),  warm salt water gargles, throat lozenges, rest, hydration.    If you  smoke, please stop smoking.    You must understand that you've received an Urgent Care treatment only and that you may be released before all of your medical problems are known or treated. You, the patient, will arrange for follow up as instructed. If your symptoms worsen or fail to improve you should go to the Emergency Room.

## 2024-07-16 NOTE — PROGRESS NOTES
"Subjective:      Patient ID: Schuyler Calles Jr. is a 39 y.o. male.    Vitals:  height is 5' 7" (1.702 m) and weight is 103.7 kg (228 lb 9.9 oz). His oral temperature is 98.5 °F (36.9 °C). His blood pressure is 149/101 (abnormal) and his pulse is 84. His respiration is 17 and oxygen saturation is 96%.     Chief Complaint: Cough    Pt coming into clinic with congestion, cough, nausea, diarrhea, vomiting, no appetite, sx started yesterday, treatment includes mucinex with mild relief.  Denies fever or chills, but having nausea and diarrhea since this am  No vomiting          Cough  This is a new problem. The current episode started yesterday. The problem has been unchanged. The problem occurs constantly. The cough is Non-productive. Associated symptoms include chills, headaches, nasal congestion and postnasal drip. Pertinent negatives include no chest pain, ear congestion, ear pain, fever, heartburn, hemoptysis, myalgias, rash, rhinorrhea, sore throat, shortness of breath, sweats, weight loss or wheezing. Nothing aggravates the symptoms. Treatments tried: mucinex. The treatment provided mild relief. There is no history of asthma, bronchiectasis, bronchitis, COPD, emphysema, environmental allergies or pneumonia.       Constitution: Positive for chills. Negative for fever.   HENT:  Positive for postnasal drip. Negative for ear pain and sore throat.    Cardiovascular:  Negative for chest pain.   Respiratory:  Positive for cough. Negative for bloody sputum, shortness of breath and wheezing.    Gastrointestinal:  Negative for heartburn.   Musculoskeletal:  Negative for muscle ache.   Skin:  Negative for rash.   Allergic/Immunologic: Negative for environmental allergies.   Neurological:  Positive for headaches.      Objective:     Physical Exam   Constitutional: He is oriented to person, place, and time. He appears well-developed. He is cooperative.  Non-toxic appearance. He does not appear ill. No distress.   HENT: "   Head: Normocephalic and atraumatic.   Ears:   Right Ear: Hearing, tympanic membrane, external ear and ear canal normal.   Left Ear: Hearing, tympanic membrane, external ear and ear canal normal.   Nose: Nose normal. No mucosal edema, rhinorrhea or nasal deformity. No epistaxis. Right sinus exhibits no maxillary sinus tenderness and no frontal sinus tenderness. Left sinus exhibits no maxillary sinus tenderness and no frontal sinus tenderness.   Mouth/Throat: Uvula is midline, oropharynx is clear and moist and mucous membranes are normal. Mucous membranes are moist. No trismus in the jaw. Normal dentition. No uvula swelling. No oropharyngeal exudate. Oropharynx is clear.   Eyes: Conjunctivae and lids are normal. Right eye exhibits no discharge. Left eye exhibits no discharge. No scleral icterus.   Neck: Trachea normal and phonation normal. Neck supple.   Cardiovascular: Normal rate, regular rhythm, normal heart sounds and normal pulses.   Pulmonary/Chest: Effort normal and breath sounds normal. No respiratory distress.   Abdominal: Normal appearance and bowel sounds are normal. He exhibits no distension and no mass. Soft. There is no abdominal tenderness.   Musculoskeletal: Normal range of motion.         General: No deformity. Normal range of motion.   Neurological: He is alert and oriented to person, place, and time. He exhibits normal muscle tone. Coordination normal.   Skin: Skin is warm, dry, intact, not diaphoretic and not pale.   Psychiatric: His speech is normal and behavior is normal. Judgment and thought content normal.   Nursing note and vitals reviewed.      Assessment:     1. Cough, unspecified type    2. URI, acute    3. Lab test positive for detection of COVID-19 virus        Plan:       Cough, unspecified type  -     SARS Coronavirus 2 Antigen, POCT Manual Read    URI, acute    Lab test positive for detection of COVID-19 virus    Other orders  -     loratadine (CLARITIN) 10 mg tablet; Take 1 tablet  (10 mg total) by mouth once daily.  Dispense: 30 tablet; Refill: 2  -     benzonatate (TESSALON PERLES) 100 MG capsule; Take 2 capsules (200 mg total) by mouth 3 (three) times daily as needed for Cough.  Dispense: 30 capsule; Refill: 1  -     nirmatrelvir-ritonavir (PAXLOVID) 300 mg (150 mg x 2)-100 mg copackaged tablets (EUA); Take 3 tablets by mouth 2 (two) times daily. Each dose contains 2 nirmatrelvir (pink tablets) and 1 ritonavir (white tablet). Take all 3 tablets together  Dispense: 30 tablet; Refill: 0         Results for orders placed or performed in visit on 07/16/24   SARS Coronavirus 2 Antigen, POCT Manual Read   Result Value Ref Range    SARS Coronavirus 2 Antigen Positive (A) Negative     Acceptable Yes

## 2024-07-22 ENCOUNTER — NURSE TRIAGE (OUTPATIENT)
Dept: ADMINISTRATIVE | Facility: CLINIC | Age: 39
End: 2024-07-22
Payer: COMMERCIAL

## 2024-07-22 ENCOUNTER — HOSPITAL ENCOUNTER (EMERGENCY)
Facility: HOSPITAL | Age: 39
Discharge: HOME | End: 2024-07-22
Attending: EMERGENCY MEDICINE
Payer: COMMERCIAL

## 2024-07-22 VITALS
HEART RATE: 92 BPM | HEIGHT: 67 IN | RESPIRATION RATE: 20 BRPM | OXYGEN SATURATION: 95 % | WEIGHT: 225 LBS | SYSTOLIC BLOOD PRESSURE: 128 MMHG | TEMPERATURE: 99 F | BODY MASS INDEX: 35.31 KG/M2 | DIASTOLIC BLOOD PRESSURE: 81 MMHG

## 2024-07-22 DIAGNOSIS — U07.1 COVID: ICD-10-CM

## 2024-07-22 DIAGNOSIS — E87.6 HYPOKALEMIA: Primary | ICD-10-CM

## 2024-07-22 LAB
ALBUMIN SERPL BCP-MCNC: 4.1 G/DL (ref 3.5–5.2)
ALP SERPL-CCNC: 64 U/L (ref 55–135)
ALT SERPL W/O P-5'-P-CCNC: 30 U/L (ref 10–44)
ANION GAP SERPL CALC-SCNC: 14 MMOL/L (ref 8–16)
AST SERPL-CCNC: 21 U/L (ref 10–40)
BASOPHILS # BLD AUTO: 0.02 K/UL (ref 0–0.2)
BASOPHILS NFR BLD: 0.3 % (ref 0–1.9)
BILIRUB SERPL-MCNC: 0.7 MG/DL (ref 0.1–1)
BILIRUB UR QL STRIP: NEGATIVE
BUN SERPL-MCNC: 11 MG/DL (ref 6–20)
CALCIUM SERPL-MCNC: 9.6 MG/DL (ref 8.7–10.5)
CHLORIDE SERPL-SCNC: 105 MMOL/L (ref 95–110)
CLARITY UR: CLEAR
CO2 SERPL-SCNC: 22 MMOL/L (ref 23–29)
COLOR UR: COLORLESS
CREAT SERPL-MCNC: 0.9 MG/DL (ref 0.5–1.4)
DIFFERENTIAL METHOD BLD: ABNORMAL
EOSINOPHIL # BLD AUTO: 0.1 K/UL (ref 0–0.5)
EOSINOPHIL NFR BLD: 1.5 % (ref 0–8)
ERYTHROCYTE [DISTWIDTH] IN BLOOD BY AUTOMATED COUNT: 11.9 % (ref 11.5–14.5)
EST. GFR  (NO RACE VARIABLE): >60 ML/MIN/1.73 M^2
GLUCOSE SERPL-MCNC: 86 MG/DL (ref 70–110)
GLUCOSE UR QL STRIP: NEGATIVE
HCT VFR BLD AUTO: 38.1 % (ref 40–54)
HGB BLD-MCNC: 14 G/DL (ref 14–18)
HGB UR QL STRIP: NEGATIVE
IMM GRANULOCYTES # BLD AUTO: 0.02 K/UL (ref 0–0.04)
IMM GRANULOCYTES NFR BLD AUTO: 0.3 % (ref 0–0.5)
KETONES UR QL STRIP: NEGATIVE
LEUKOCYTE ESTERASE UR QL STRIP: NEGATIVE
LIPASE SERPL-CCNC: 50 U/L (ref 4–60)
LYMPHOCYTES # BLD AUTO: 2.5 K/UL (ref 1–4.8)
LYMPHOCYTES NFR BLD: 41 % (ref 18–48)
MCH RBC QN AUTO: 31.3 PG (ref 27–31)
MCHC RBC AUTO-ENTMCNC: 36.7 G/DL (ref 32–36)
MCV RBC AUTO: 85 FL (ref 82–98)
MONOCYTES # BLD AUTO: 0.3 K/UL (ref 0.3–1)
MONOCYTES NFR BLD: 5.5 % (ref 4–15)
NEUTROPHILS # BLD AUTO: 3.1 K/UL (ref 1.8–7.7)
NEUTROPHILS NFR BLD: 51.4 % (ref 38–73)
NITRITE UR QL STRIP: NEGATIVE
NRBC BLD-RTO: 0 /100 WBC
PH UR STRIP: 7 [PH] (ref 5–8)
PLATELET # BLD AUTO: 270 K/UL (ref 150–450)
PMV BLD AUTO: 11.3 FL (ref 9.2–12.9)
POTASSIUM SERPL-SCNC: 3.2 MMOL/L (ref 3.5–5.1)
PROT SERPL-MCNC: 7.9 G/DL (ref 6–8.4)
PROT UR QL STRIP: NEGATIVE
RBC # BLD AUTO: 4.47 M/UL (ref 4.6–6.2)
SODIUM SERPL-SCNC: 141 MMOL/L (ref 136–145)
SP GR UR STRIP: 1.01 (ref 1–1.03)
URN SPEC COLLECT METH UR: ABNORMAL
UROBILINOGEN UR STRIP-ACNC: NEGATIVE EU/DL
WBC # BLD AUTO: 6 K/UL (ref 3.9–12.7)

## 2024-07-22 PROCEDURE — 96365 THER/PROPH/DIAG IV INF INIT: CPT

## 2024-07-22 PROCEDURE — 81003 URINALYSIS AUTO W/O SCOPE: CPT | Performed by: NURSE PRACTITIONER

## 2024-07-22 PROCEDURE — 96361 HYDRATE IV INFUSION ADD-ON: CPT

## 2024-07-22 PROCEDURE — 99284 EMERGENCY DEPT VISIT MOD MDM: CPT | Mod: 25

## 2024-07-22 PROCEDURE — 85025 COMPLETE CBC W/AUTO DIFF WBC: CPT | Performed by: NURSE PRACTITIONER

## 2024-07-22 PROCEDURE — 63600175 PHARM REV CODE 636 W HCPCS: Performed by: NURSE PRACTITIONER

## 2024-07-22 PROCEDURE — 80053 COMPREHEN METABOLIC PANEL: CPT | Performed by: NURSE PRACTITIONER

## 2024-07-22 PROCEDURE — 25000003 PHARM REV CODE 250: Performed by: NURSE PRACTITIONER

## 2024-07-22 PROCEDURE — 83690 ASSAY OF LIPASE: CPT | Performed by: NURSE PRACTITIONER

## 2024-07-22 RX ORDER — PROMETHAZINE HYDROCHLORIDE 25 MG/1
25 TABLET ORAL EVERY 8 HOURS PRN
Qty: 12 TABLET | Refills: 0 | Status: SHIPPED | OUTPATIENT
Start: 2024-07-22

## 2024-07-22 RX ORDER — ACETAMINOPHEN 500 MG
1000 TABLET ORAL
Status: COMPLETED | OUTPATIENT
Start: 2024-07-22 | End: 2024-07-22

## 2024-07-22 RX ADMIN — POTASSIUM BICARBONATE 20 MEQ: 391 TABLET, EFFERVESCENT ORAL at 12:07

## 2024-07-22 RX ADMIN — ACETAMINOPHEN 1000 MG: 500 TABLET ORAL at 02:07

## 2024-07-22 RX ADMIN — PROMETHAZINE HYDROCHLORIDE 12.5 MG: 25 INJECTION INTRAMUSCULAR; INTRAVENOUS at 11:07

## 2024-07-22 RX ADMIN — SODIUM CHLORIDE 1000 ML: 9 INJECTION, SOLUTION INTRAVENOUS at 11:07

## 2024-07-22 NOTE — ED TRIAGE NOTES
COVID + on 7/16. States he has been having intermittent N/V/D controlled with home meds but today, unable to tolerate PO liquids and c/o generalized lightheadedness and weakness. Denies fever but states he feels clammy with intermittent chills. + intermittent abdominal cramping. Presents awake, alert. Appears uncomfortable.

## 2024-07-22 NOTE — DISCHARGE INSTRUCTIONS

## 2024-07-22 NOTE — ED PROVIDER NOTES
Encounter Date: 2024       History     Chief Complaint   Patient presents with    Fatigue    Nausea     Pt c/o nausea and fatigue since Tuesday when diagnosed w/ covid PMH htn     39 yr old male presents to the ER with reports of nausea, vomiting, diarrhea, cough and weakness. Pt states he has been taking Zofran for symptoms as he was diagnosed with COVID on  however that med is no longer helping his symptoms. Denies fever, rash or neck stiffness. Pt states no melena, hematochezia or hematemesis. Pt states PMH of anxiety, panic disorder, HTN, PTSD.    The history is provided by the patient. No  was used.     Review of patient's allergies indicates:   Allergen Reactions    Cat dander     Grass pollen- grass standard      Past Medical History:   Diagnosis Date    Anxiety     Depression     History of psychiatric care     History of psychiatric hospitalization      [facility] [place]     Hx of psychiatric care     currently on klonopin, wellbutrin, neurotin    Hypertension     Panic disorder     Psychiatric exam requested by authority     Psychiatric problem     debilitating panic, mental illness since 20 yo    PTSD (post-traumatic stress disorder)     Substance abuse     Suicide attempt     over dose in .     Therapy     states sees JULIA López 2-3 times /week and also Dr Angelo Ramsay for medication adjustments    Withdrawal symptoms, drug or narcotic     after overdose on klonopin     No past surgical history on file.  Family History   Problem Relation Name Age of Onset    Cancer Mother 49          lung ca    Heart disease Mother 49         chf    Hypertension Mother 49     Asthma Sister      Drug abuse Sister      Depression Sister      Heart disease Maternal Grandfather          chf    Stroke Maternal Grandfather      Drug abuse Father unk     Suicide Other great uncle      Social History     Tobacco Use    Smoking status: Some Days     Types:  Vaping with nicotine    Smokeless tobacco: Never   Substance Use Topics    Alcohol use: Yes     Alcohol/week: 1.0 standard drink of alcohol     Types: 1 Glasses of wine per week     Comment: 2 glasses of wine once or twice weekly    Drug use: Yes     Types: Benzodiazepines     Comment: misuse of sedative- hyptonics/ klonopin overdose      Review of Systems   Constitutional:  Positive for chills.   Respiratory:  Positive for cough.    Gastrointestinal:  Positive for diarrhea, nausea and vomiting.   Neurological:  Positive for weakness.   All other systems reviewed and are negative.      Physical Exam     Initial Vitals [07/22/24 1009]   BP Pulse Resp Temp SpO2   (!) 166/114 78 20 98.7 °F (37.1 °C) 98 %      MAP       --         Physical Exam    Constitutional: He appears well-developed and well-nourished. He is not diaphoretic. He is Obese . He appears ill. No distress.   HENT:   Head: Normocephalic and atraumatic.   Right Ear: Hearing and tympanic membrane normal.   Left Ear: Hearing and tympanic membrane normal.   Nose: Nose normal.   Mouth/Throat: Uvula is midline, oropharynx is clear and moist and mucous membranes are normal.   Eyes: Lids are normal. Pupils are equal, round, and reactive to light.   Cardiovascular:  Normal rate.           Pulmonary/Chest: Effort normal and breath sounds normal. No respiratory distress. He has no wheezes. He has no rhonchi.   Abdominal: Abdomen is soft. There is no abdominal tenderness.   Musculoskeletal:         General: Normal range of motion.      Cervical back: No rigidity.     Neurological: He is oriented to person, place, and time.   Skin: Skin is warm and dry. No rash noted.   Psychiatric: He has a normal mood and affect. His behavior is normal. Judgment and thought content normal.         ED Course   Procedures  Labs Reviewed   COMPREHENSIVE METABOLIC PANEL - Abnormal       Result Value    Sodium 141      Potassium 3.2 (*)     Chloride 105      CO2 22 (*)     Glucose 86       BUN 11      Creatinine 0.9      Calcium 9.6      Total Protein 7.9      Albumin 4.1      Total Bilirubin 0.7      Alkaline Phosphatase 64      AST 21      ALT 30      eGFR >60      Anion Gap 14     CBC W/ AUTO DIFFERENTIAL - Abnormal    WBC 6.00      RBC 4.47 (*)     Hemoglobin 14.0      Hematocrit 38.1 (*)     MCV 85      MCH 31.3 (*)     MCHC 36.7 (*)     RDW 11.9      Platelets 270      MPV 11.3      Immature Granulocytes 0.3      Gran # (ANC) 3.1      Immature Grans (Abs) 0.02      Lymph # 2.5      Mono # 0.3      Eos # 0.1      Baso # 0.02      nRBC 0      Gran % 51.4      Lymph % 41.0      Mono % 5.5      Eosinophil % 1.5      Basophil % 0.3      Differential Method Automated     URINALYSIS, REFLEX TO URINE CULTURE - Abnormal    Specimen UA Urine, Clean Catch      Color, UA Colorless (*)     Appearance, UA Clear      pH, UA 7.0      Specific Gravity, UA 1.010      Protein, UA Negative      Glucose, UA Negative      Ketones, UA Negative      Bilirubin (UA) Negative      Occult Blood UA Negative      Nitrite, UA Negative      Urobilinogen, UA Negative      Leukocytes, UA Negative      Narrative:     Specimen Source->Urine   LIPASE    Lipase 50            Imaging Results              X-Ray Chest 1 View (Final result)  Result time 07/22/24 11:29:24      Final result by Annel Hitchcock MD (07/22/24 11:29:24)                   Impression:      No acute abnormality.      Electronically signed by: Annel Hitchcock MD  Date:    07/22/2024  Time:    11:29               Narrative:    EXAMINATION:  XR CHEST 1 VIEW    CLINICAL HISTORY:  COVID-19    TECHNIQUE:  Single frontal view of the chest was performed.    COMPARISON:  06/10/2016    FINDINGS:  The lungs are clear, with normal appearance of pulmonary vasculature and no pleural effusion or pneumothorax.    The cardiac silhouette is normal in size. The hilar and mediastinal contours are unremarkable.    Bones are intact.                                        Medications   acetaminophen tablet 1,000 mg (has no administration in time range)   sodium chloride 0.9% bolus 1,000 mL 1,000 mL (0 mLs Intravenous Stopped 7/22/24 1234)   promethazine (PHENERGAN) 12.5 mg in 0.9% NaCl 50 mL IVPB (0 mg Intravenous Stopped 7/22/24 1127)   potassium bicarbonate disintegrating tablet 20 mEq (20 mEq Oral Given 7/22/24 1248)     Medical Decision Making  Differential Diagnosis includes, but is not limited to:  COVID-19 infection, influenza, bacterial sinusitis, allergic rhinitis, bacterial/viral pharyngitis, peritonsillar abscess, retropharyngeal abscess, bacterial/viral pneumonia.     Amount and/or Complexity of Data Reviewed  Labs: ordered. Decision-making details documented in ED Course.  Radiology: ordered.    Risk  OTC drugs.  Prescription drug management.               ED Course as of 07/22/24 1457   Mon Jul 22, 2024   1205 FINDINGS:  The lungs are clear, with normal appearance of pulmonary vasculature and no pleural effusion or pneumothorax.     The cardiac silhouette is normal in size. The hilar and mediastinal contours are unremarkable.     Bones are intact.     Impression:     No acute abnormality.   [DT]   1206 Independent interpretation of chest xray identifies no acute abnormalities.  [DT]   1236 Comprehensive metabolic panel(!) [DT]   1236 CBC auto differential(!) [DT]   1256 UA pending.  [DT]   1424 Urinalysis, Reflex to Urine Culture Urine, Clean Catch(!) [DT]   1452 Pt states he is feeling better outside of a slight headache. No vomiting or diarrhea. Pt notified of the need for follow up care with pcp and the meds prescribed for nausea as his Zofran did not work. Will change to Phenergan. In addition, STRICT return precautions given.  [DT]      ED Course User Index  [DT] Leidy Chisholm, NP                           Clinical Impression:  Final diagnoses:  [U07.1] COVID  [E87.6] Hypokalemia (Primary)          ED Disposition Condition    Discharge Stable          ED  Prescriptions       Medication Sig Dispense Start Date End Date Auth. Provider    promethazine (PHENERGAN) 25 MG tablet Take 1 tablet (25 mg total) by mouth every 8 (eight) hours as needed for Nausea (nausea, vomiting). 12 tablet 7/22/2024 -- Leidy Chisholm NP          Follow-up Information       Follow up With Specialties Details Why Contact Info    Russell Ortiz MD Internal Medicine Schedule an appointment as soon as possible for a visit in 2 days  [address]  [place] [address]  [phone]               Leidy Chisholm NP  07/22/24 7855

## 2024-07-22 NOTE — TELEPHONE ENCOUNTER
"[facility] caller Schuyler reports diagnosed with Covid on 7/16/24. Finished Paxlovid but awoke with nausea & vomiting, feeling very weak, can barely drive if needed to drive or walk.  +cold sweats.  +clammy/sweaty Denies fever. Minimal relief with prescribed zofran. Per triage protocol, go to UC/ED or OV now. Secure chat sent to ZENAIDA Viera NP recommends "send to UC or ED." Advised pt to go to nearest  or ED now for physician karl. Instructed to call  now if no immediate . V/u.     Reason for Disposition   Drinking very little and dehydration suspected (e.g., no urine > 12 hours, very dry mouth, very lightheaded)    Additional Information   Negative: SEVERE difficulty breathing (e.g., struggling for each breath, speaks in single words)   Negative: Difficult to awaken or acting confused (e.g., disoriented, slurred speech)   Negative: Bluish (or gray) lips or face now   Negative: Shock suspected (e.g., cold/pale/clammy skin, too weak to stand, low BP, rapid pulse)   Negative: Sounds like a life-threatening emergency to the triager   Negative: SEVERE or constant chest pain or pressure  (Exception: Mild central chest pain, present only when coughing.)   Negative: MODERATE difficulty breathing (e.g., speaks in phrases, SOB even at rest, pulse 100-120)   Negative: Headache and stiff neck (can't touch chin to chest)   Negative: Oxygen level (e.g., pulse oximetry) 90% or lower   Negative: Chest pain or pressure  (Exception: MILD central chest pain, present only when coughing.)    Protocols used: Coronavirus (COVID-19) Diagnosed or [id number]    "

## 2024-07-22 NOTE — ED NOTES
Pt reports dx of COVID last week. Pt states he took all prescribed rx and began feeling better on Saturday, 7/20. Last night pt began coughing and sx worsened. Today pt has non-productive cough, nasal congestion, fatigue, N/V/D. Pt took prescribed ZOFRAN ODT w/o relief.

## 2024-07-22 NOTE — Clinical Note
"Schuyler"Tawanda Calles was seen and treated in our emergency department on 7/22/2024.  He may return to work on 07/24/2024.       If you have any questions or concerns, please don't hesitate to call.      Leidy Chisholm, ESTRADA"

## 2025-01-05 ENCOUNTER — HOSPITAL ENCOUNTER (EMERGENCY)
Facility: HOSPITAL | Age: 40
Discharge: HOME | End: 2025-01-05
Attending: EMERGENCY MEDICINE
Payer: COMMERCIAL

## 2025-01-05 VITALS
DIASTOLIC BLOOD PRESSURE: 97 MMHG | WEIGHT: 215 LBS | HEIGHT: 67 IN | TEMPERATURE: 99 F | RESPIRATION RATE: 20 BRPM | OXYGEN SATURATION: 98 % | BODY MASS INDEX: 33.74 KG/M2 | SYSTOLIC BLOOD PRESSURE: 140 MMHG | HEART RATE: 111 BPM

## 2025-01-05 DIAGNOSIS — F32.9 REACTIVE DEPRESSION: Primary | ICD-10-CM

## 2025-01-05 PROCEDURE — G0425 INPT/ED TELECONSULT30: HCPCS | Mod: GT,,, | Performed by: STUDENT IN AN ORGANIZED HEALTH CARE EDUCATION/TRAINING PROGRAM

## 2025-01-05 PROCEDURE — 99283 EMERGENCY DEPT VISIT LOW MDM: CPT

## 2025-01-05 NOTE — ED PROVIDER NOTES
"Encounter Date: 2025       History     Chief Complaint   Patient presents with    Mental Health Problem     States he is having a "mental health crises". States he is having a lot of issues at home and impulsively told his S.O. that he wanted to die. States he didn't mean it but states he has had mental issues for some time. Presents alert, cooperative, tearful. No distress noted.     The patient is a 39-year-old male who came to the emergency department with the police.  The patient and his  were having a fight earlier today and he told his  in an angry moment that he just wants to die.  He has no suicide plan and no attempts.  The patient has a history of depression and goes to therapy weekly.  He states he has been admitted to psychiatric facilities in the past.  No recent suicide attempts.      Review of patient's allergies indicates:   Allergen Reactions    Cat dander     Grass pollen-sussy grass standard      Past Medical History:   Diagnosis Date    Anxiety     Depression     History of psychiatric care     History of psychiatric hospitalization      was at [place]. [facility]     Hx of psychiatric care     currently on klonopin, wellbutrin, neurotin    Hypertension     Panic disorder     Psychiatric exam requested by authority     Psychiatric problem     debilitating panic, mental illness since 20 yo    PTSD (post-traumatic stress disorder)     Substance abuse     Suicide attempt     over dose in .     Therapy     states sees JULIA López 2-3 times /week and also Dr Angelo Ramsay for medication adjustments    Withdrawal symptoms, drug or narcotic     after overdose on klonopin     History reviewed. No pertinent surgical history.  Family History   Problem Relation Name Age of Onset    Cancer Mother 49          lung ca    Heart disease Mother 49         chf    Hypertension Mother 49     Asthma Sister      Drug abuse Sister      Depression Sister      Heart disease " Maternal Grandfather          chf    Stroke Maternal Grandfather      Drug abuse Father unk     Suicide Other great uncle      Social History     Tobacco Use    Smoking status: Some Days     Types: Vaping with nicotine    Smokeless tobacco: Never   Substance Use Topics    Alcohol use: Yes     Alcohol/week: 1.0 standard drink of alcohol     Types: 1 Glasses of wine per week     Comment: 2 glasses of wine once or twice weekly    Drug use: Yes     Types: Benzodiazepines     Comment: misuse of sedative- hyptonics/ klonopin overdose      Review of Systems   All other systems reviewed and are negative.      Physical Exam     Initial Vitals [01/05/25 1307]   BP Pulse Resp Temp SpO2   (!) 140/97 (!) 111 20 98.7 °F (37.1 °C) 98 %      MAP       --         Physical Exam    Nursing note and vitals reviewed.  Constitutional: He appears well-developed and well-nourished.   HENT:   Head: Normocephalic and atraumatic.   Eyes: Pupils are equal, round, and reactive to light.   Neck: Neck supple.   Normal range of motion.  Cardiovascular:  Normal rate, regular rhythm, normal heart sounds and intact distal pulses.           Pulmonary/Chest: Breath sounds normal.   Abdominal: Abdomen is soft.   Musculoskeletal:         General: Normal range of motion.      Cervical back: Normal range of motion and neck supple.     Neurological: He is alert and oriented to person, place, and time.   Skin: Skin is warm and dry.   Psychiatric: He has a normal mood and affect. His behavior is normal. Judgment and thought content normal.   Tearful but calm and cooperative.  The patient is focal about the events of today.  No suicidal or homicidal thoughts.  No flight of ideas, no hallucinations         ED Course   Procedures  Labs Reviewed - No data to display       Imaging Results    None          Medications - No data to display  Medical Decision Making  Differential Diagnosis includes, but is not limited to:  Decompensated psychiatric disease  (schizophrenia, bipolar disorder, major depression), excited delirium, medication noncompliance, substance abuse/withdrawal, intentional drug overdose, medication toxicity, APAP/ASA overdose, acute stress reaction, personality disorder, malingering, metabolic derangement      Medical decision-making: This is a 39-year-old male with a history of depression and states he has been having a rough time lately as January is the 15th anniversary of his mother dying and the events of the [place] quarter disturbed him.  He had a fight with his  and told his  that he just wanted to die.  He states he has no plan and has no suicidal thoughts.  I will consult tele psych to evaluate the patient for possible pec versus outpatient management with his therapist.    1650:  Psychiatry consult was placed on the computer.  The psychiatrist does not feel that the patient has suicide with this time and can be discharged home as an outpatient.  I agree with this decision.  The patient will be discharged with suicidal precautions.                                      Clinical Impression:  Final diagnoses:  [F32.9] Reactive depression (Primary)          ED Disposition Condition    Discharge Stable          ED Prescriptions    None       Follow-up Information       Follow up With Specialties Details Why Contact Info    Russell Ortiz MD Internal Medicine   [address]  [place] [address]  [phone]      Your therapist as scheduled                 Juana Wade MD  01/05/25 5407
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
## 2025-01-05 NOTE — ED NOTES
Patient sitting on the side of the bed comfortably, updated patient with the plan of care, denies needs at this time.